# Patient Record
Sex: FEMALE | Race: WHITE
[De-identification: names, ages, dates, MRNs, and addresses within clinical notes are randomized per-mention and may not be internally consistent; named-entity substitution may affect disease eponyms.]

---

## 2017-12-09 ENCOUNTER — HOSPITAL ENCOUNTER (INPATIENT)
Dept: HOSPITAL 17 - NEPC | Age: 82
LOS: 8 days | Discharge: HOME HEALTH | DRG: 155 | End: 2017-12-17
Payer: MEDICARE

## 2017-12-09 VITALS — HEIGHT: 61 IN | WEIGHT: 162.04 LBS | BODY MASS INDEX: 30.59 KG/M2

## 2017-12-09 VITALS
HEART RATE: 83 BPM | RESPIRATION RATE: 16 BRPM | DIASTOLIC BLOOD PRESSURE: 78 MMHG | SYSTOLIC BLOOD PRESSURE: 140 MMHG | OXYGEN SATURATION: 99 % | TEMPERATURE: 98 F

## 2017-12-09 DIAGNOSIS — F32.9: ICD-10-CM

## 2017-12-09 DIAGNOSIS — E87.1: ICD-10-CM

## 2017-12-09 DIAGNOSIS — N39.0: ICD-10-CM

## 2017-12-09 DIAGNOSIS — E78.5: ICD-10-CM

## 2017-12-09 DIAGNOSIS — G25.81: ICD-10-CM

## 2017-12-09 DIAGNOSIS — M50.90: ICD-10-CM

## 2017-12-09 DIAGNOSIS — K11.21: Primary | ICD-10-CM

## 2017-12-09 DIAGNOSIS — M79.7: ICD-10-CM

## 2017-12-09 DIAGNOSIS — R11.2: ICD-10-CM

## 2017-12-09 DIAGNOSIS — Z16.12: ICD-10-CM

## 2017-12-09 DIAGNOSIS — B96.20: ICD-10-CM

## 2017-12-09 DIAGNOSIS — Z87.891: ICD-10-CM

## 2017-12-09 DIAGNOSIS — E03.9: ICD-10-CM

## 2017-12-09 DIAGNOSIS — R41.0: ICD-10-CM

## 2017-12-09 DIAGNOSIS — D32.0: ICD-10-CM

## 2017-12-09 DIAGNOSIS — L03.211: ICD-10-CM

## 2017-12-09 PROCEDURE — 80048 BASIC METABOLIC PNL TOTAL CA: CPT

## 2017-12-09 PROCEDURE — 70491 CT SOFT TISSUE NECK W/DYE: CPT

## 2017-12-09 PROCEDURE — 87040 BLOOD CULTURE FOR BACTERIA: CPT

## 2017-12-09 PROCEDURE — 85025 COMPLETE CBC W/AUTO DIFF WBC: CPT

## 2017-12-09 PROCEDURE — 87086 URINE CULTURE/COLONY COUNT: CPT

## 2017-12-09 PROCEDURE — 71010: CPT

## 2017-12-09 PROCEDURE — 87186 SC STD MICRODIL/AGAR DIL: CPT

## 2017-12-09 PROCEDURE — 96374 THER/PROPH/DIAG INJ IV PUSH: CPT

## 2017-12-09 PROCEDURE — 81001 URINALYSIS AUTO W/SCOPE: CPT

## 2017-12-09 PROCEDURE — 87077 CULTURE AEROBIC IDENTIFY: CPT

## 2017-12-09 PROCEDURE — 96375 TX/PRO/DX INJ NEW DRUG ADDON: CPT

## 2017-12-09 PROCEDURE — 93005 ELECTROCARDIOGRAM TRACING: CPT

## 2017-12-10 VITALS
TEMPERATURE: 97.5 F | OXYGEN SATURATION: 96 % | HEART RATE: 77 BPM | RESPIRATION RATE: 18 BRPM | DIASTOLIC BLOOD PRESSURE: 62 MMHG | SYSTOLIC BLOOD PRESSURE: 138 MMHG

## 2017-12-10 VITALS
SYSTOLIC BLOOD PRESSURE: 113 MMHG | DIASTOLIC BLOOD PRESSURE: 54 MMHG | HEART RATE: 75 BPM | OXYGEN SATURATION: 96 % | TEMPERATURE: 97.8 F | RESPIRATION RATE: 16 BRPM

## 2017-12-10 VITALS
SYSTOLIC BLOOD PRESSURE: 124 MMHG | HEART RATE: 82 BPM | OXYGEN SATURATION: 97 % | RESPIRATION RATE: 17 BRPM | DIASTOLIC BLOOD PRESSURE: 60 MMHG | TEMPERATURE: 98.4 F

## 2017-12-10 VITALS
RESPIRATION RATE: 18 BRPM | SYSTOLIC BLOOD PRESSURE: 102 MMHG | OXYGEN SATURATION: 97 % | HEART RATE: 89 BPM | TEMPERATURE: 98 F | DIASTOLIC BLOOD PRESSURE: 68 MMHG

## 2017-12-10 VITALS
DIASTOLIC BLOOD PRESSURE: 58 MMHG | OXYGEN SATURATION: 98 % | RESPIRATION RATE: 18 BRPM | TEMPERATURE: 97.4 F | HEART RATE: 91 BPM | SYSTOLIC BLOOD PRESSURE: 134 MMHG

## 2017-12-10 VITALS
OXYGEN SATURATION: 98 % | SYSTOLIC BLOOD PRESSURE: 115 MMHG | HEART RATE: 73 BPM | DIASTOLIC BLOOD PRESSURE: 53 MMHG | TEMPERATURE: 97.8 F | RESPIRATION RATE: 19 BRPM

## 2017-12-10 VITALS
HEART RATE: 77 BPM | DIASTOLIC BLOOD PRESSURE: 54 MMHG | OXYGEN SATURATION: 95 % | TEMPERATURE: 97.6 F | RESPIRATION RATE: 16 BRPM | SYSTOLIC BLOOD PRESSURE: 118 MMHG

## 2017-12-10 LAB
ANION GAP SERPL CALC-SCNC: 7 MEQ/L (ref 5–15)
BASOPHILS # BLD AUTO: 0 TH/MM3 (ref 0–0.2)
BASOPHILS NFR BLD: 0.4 % (ref 0–2)
BUN SERPL-MCNC: 13 MG/DL (ref 7–18)
CHLORIDE SERPL-SCNC: 95 MEQ/L (ref 98–107)
COLOR UR: YELLOW
COMMENT (UR): (no result)
CULTURE IF INDICATED: (no result)
EOSINOPHIL # BLD: 0.2 TH/MM3 (ref 0–0.4)
EOSINOPHIL NFR BLD: 2.9 % (ref 0–4)
ERYTHROCYTE [DISTWIDTH] IN BLOOD BY AUTOMATED COUNT: 15 % (ref 11.6–17.2)
GFR SERPLBLD BASED ON 1.73 SQ M-ARVRAT: 61 ML/MIN (ref 89–?)
GLUCOSE UR STRIP-MCNC: (no result) MG/DL
HCO3 BLD-SCNC: 28.4 MEQ/L (ref 21–32)
HCT VFR BLD CALC: 40 % (ref 35–46)
HEMO FLAGS: (no result)
HGB UR QL STRIP: (no result)
HYALINE CASTS #/AREA URNS LPF: 3 /LPF
KETONES UR STRIP-MCNC: (no result) MG/DL
LYMPHOCYTES # BLD AUTO: 1.3 TH/MM3 (ref 1–4.8)
LYMPHOCYTES NFR BLD AUTO: 15.3 % (ref 9–44)
MCH RBC QN AUTO: 26.8 PG (ref 27–34)
MCHC RBC AUTO-ENTMCNC: 32.8 % (ref 32–36)
MCV RBC AUTO: 81.8 FL (ref 80–100)
MONOCYTES NFR BLD: 6.8 % (ref 0–8)
MUCOUS THREADS #/AREA URNS LPF: (no result) /LPF
NEUTROPHILS # BLD AUTO: 6.4 TH/MM3 (ref 1.8–7.7)
NEUTROPHILS NFR BLD AUTO: 74.6 % (ref 16–70)
NITRITE UR QL STRIP: (no result)
PLATELET # BLD: 170 TH/MM3 (ref 150–450)
POTASSIUM SERPL-SCNC: 3.9 MEQ/L (ref 3.5–5.1)
RBC # BLD AUTO: 4.89 MIL/MM3 (ref 4–5.3)
SODIUM SERPL-SCNC: 130 MEQ/L (ref 136–145)
SP GR UR STRIP: 1.05 (ref 1–1.03)
SQUAMOUS #/AREA URNS HPF: 1 /HPF (ref 0–5)
WBC # BLD AUTO: 8.5 TH/MM3 (ref 4–11)

## 2017-12-10 RX ADMIN — NAFCILLIN SCH MLS/HR: 1 INJECTION, POWDER, FOR SOLUTION INTRAMUSCULAR; INTRAVENOUS at 21:38

## 2017-12-10 RX ADMIN — PHENYTOIN SODIUM SCH MLS/HR: 50 INJECTION INTRAMUSCULAR; INTRAVENOUS at 12:12

## 2017-12-10 RX ADMIN — HYDROCODONE BITARTRATE AND ACETAMINOPHEN PRN TAB: 5; 325 TABLET ORAL at 15:55

## 2017-12-10 RX ADMIN — Medication SCH ML: at 21:00

## 2017-12-10 RX ADMIN — CLINDAMYCIN PHOSPHATE SCH MLS/HR: 150 INJECTION, SOLUTION INTRAMUSCULAR; INTRAVENOUS at 14:19

## 2017-12-10 RX ADMIN — NAFCILLIN SCH MLS/HR: 1 INJECTION, POWDER, FOR SOLUTION INTRAMUSCULAR; INTRAVENOUS at 16:30

## 2017-12-10 RX ADMIN — HYDROCODONE BITARTRATE AND ACETAMINOPHEN PRN TAB: 5; 325 TABLET ORAL at 21:39

## 2017-12-10 RX ADMIN — CLINDAMYCIN PHOSPHATE SCH MLS/HR: 150 INJECTION, SOLUTION INTRAMUSCULAR; INTRAVENOUS at 21:46

## 2017-12-10 RX ADMIN — TAZOBACTAM SODIUM AND PIPERACILLIN SODIUM SCH MLS/HR: 375; 3 INJECTION, SOLUTION INTRAVENOUS at 04:00

## 2017-12-10 RX ADMIN — OXYBUTYNIN CHLORIDE SCH MG: 5 TABLET ORAL at 21:00

## 2017-12-10 RX ADMIN — PRAVASTATIN SODIUM SCH MG: 40 TABLET ORAL at 21:40

## 2017-12-10 RX ADMIN — TAZOBACTAM SODIUM AND PIPERACILLIN SODIUM SCH MLS/HR: 375; 3 INJECTION, SOLUTION INTRAVENOUS at 09:30

## 2017-12-10 RX ADMIN — HYDROCODONE BITARTRATE AND ACETAMINOPHEN PRN TAB: 5; 325 TABLET ORAL at 09:37

## 2017-12-10 RX ADMIN — NAFCILLIN SCH MLS/HR: 1 INJECTION, POWDER, FOR SOLUTION INTRAMUSCULAR; INTRAVENOUS at 23:57

## 2017-12-10 RX ADMIN — PHENYTOIN SODIUM SCH MLS/HR: 50 INJECTION INTRAMUSCULAR; INTRAVENOUS at 14:15

## 2017-12-10 RX ADMIN — Medication SCH ML: at 09:29

## 2017-12-10 NOTE — PD
HPI


Chief Complaint:  Skin Problem


Time Seen by Provider:  23:35


Travel History


International Travel<30 days:  No


Contact w/Intl Traveler<30days:  No


Traveled to known affect area:  No





History of Present Illness


HPI


89 year-old female presents to the emergency department in the care of her 

family for one day of progressively worsening redness and swelling to the right 

side of the face and neck.  No fever or chills.  Patient has noted some 

soreness with swallowing but no difficulty swallowing or handling her oral 

secretions.  Patient denies any injury.  Family members have noticed increasing 

swelling significant this evening.  Patient lives by herself.  Patient is 

currently on no antibiotics.  Patient has extensive past medical history 

including hypothyroidism dyslipidemia fibromyalgia depression anxiety 

osteoarthritis status post appendectomy cholecystectomy shoulder fracture 

hysterectomy cervical disc disease restless leg syndrome.  Family has been 

checking on her frequently.  This evening symptoms also associated with nausea 

and vomiting.  No chest pain no shortness of breath no stridor no hoarseness.  

No abdominal pain no flank pain dysuria frequency urgency.  Has noted increased 

generalized weakness.  Pain is 10 over 10 intensity.





PFSH


Past Medical History


*** Narrative Medical


Dyslipidemia hypothyroidism airway bowel syndrome depression and anxiety 

fibromyalgia osteoarthritis restless leg syndrome appendectomy cholecystectomy 

to [place] repair shoulder fracture repair hysterectomy cervical spine nerve block; 

no tobacco use no alcohol use; nursing notes reviewed


Hx Anticoagulant Therapy:  No


Anxiety:  Yes


Depression:  Yes


Cancer:  No


Cardiovascular Problems:  Yes


High Cholesterol:  Yes


Diabetes:  No


Endocrine:  Yes


Gastrointestinal Disorders:  Yes (Appy/ choly)


Glaucoma:  No


Gout:  Yes


Genitourinary:  No


Hepatitis:  No


Hiatal Hernia:  No


Hypertension:  Yes


Implanted Vascular Access Dvce:  No


Medical other:  No


Musculoskeletal:  Yes


Neurologic:  No


Reproductive:  No


Respiratory:  No


Thyroid Disease:  Yes (hypothytroidism)


Tubal Ligation:  Yes





Past Surgical History


Abdominal Surgery:  Yes (DEEJAY)


Appendectomy:  Yes


 Section:  Yes


Cholecystectomy:  Yes


Eye Surgery:  Yes (CATARACT RIGHT EYE)


Genitourinary Surgery:  Yes (bladder suspension)


Hysterectomy:  Yes


Pacemaker:  No


Other Surgery:  Yes





Social History


Alcohol Use:  No


Tobacco Use:  No


Substance Use:  No





Allergies-Medications


(Allergen,Severity, Reaction):  


Coded Allergies:  


     asparagus (Unverified  Allergy, Severe, sob, 17)


     bean pod (Unverified  Allergy, Severe, sob, 17)


     duloxetine (Unverified  Allergy, Unknown, 17)


     rivaroxaban (Unverified  Allergy, Unknown, 17)


     trazodone (Unverified  Allergy, Unknown, 17)


     *MDRO Multi-Drug Resistant Organism (Verified  Adverse Reaction, Unknown, 

17)


 ESBL+E.Coli urine 2016


Reported Meds & Prescriptions





Reported Meds & Active Scripts


Active


Active Prescriptions or Reported Medications Unobtainable    








Review of Systems


Except as stated in HPI:  all other systems reviewed are Neg


General / Constitutional:  No: Fever, Chills


HENT:  No: Congestion


Cardiovascular:  No: Chest Pain or Discomfort


Respiratory:  No: Shortness of Breath


Gastrointestinal:  Positive: Nausea, Vomiting, No: Abdominal Pain


Genitourinary:  No: Dysuria


Musculoskeletal:  No: Weakness


Skin:  Positive Rash, Positive Lumps


Neurologic:  Positive: Weakness, No: Dizziness, Syncope, Focal Abnormalities


Psychiatric:  No: Anxiety


Hematologic/Lymphatic:  No: Easy Bruising





Physical Exam


Narrative


GENERAL: Elderly frail female in no acute distress no respiratory distress


SKIN: Warm and dry.


HEAD: Normocephalic.


EYES: No scleral icterus. No injection or drainage. 


NECK: Supple, trachea midline. No JVD or lymphadenopathy.  Left-sided neck 

redness swelling and induration without fluctuance tenderness is noted 

increased warmth


CARDIOVASCULAR: Regular rate and rhythm without murmurs, gallops, or rubs. 


RESPIRATORY: Breath sounds equal bilaterally. No accessory muscle use.


GASTROINTESTINAL: Abdomen soft, non-tender, nondistended. 


MUSCULOSKELETAL: No cyanosis, or edema. 


BACK: Nontender without obvious deformity. No CVA tenderness.








Data


Data


Last Documented VS





Vital Signs








  Date Time  Temp Pulse Resp B/P (MAP) Pulse Ox O2 Delivery O2 Flow Rate FiO2


 


17 22:34 98.0 83 16 140/78 (98) 99 Room Air  








Orders





 Orders


Basic Metabolic Panel (Bmp) (17 23:35)


Complete Blood Count With Diff (17 23:35)


Blood Culture (17 23:35)


Ct Soft Tiss Neck W Iv Cont (17 23:35)


Iv Access Insert/Monitor (17 23:35)


Sodium Chloride 0.9% Flush (Ns Flush) (17 23:45)


Ketorolac Inj (Toradol Inj) (17 23:45)


Urinalysis - C+S If Indicated (17 23:35)


Iohexol 350 Inj (Omnipaque 350 Inj) (12/10/17 01:13)


Clindamycin 600 Mg/Ns Premix (Cleocin 60 (12/10/17 01:45)


Urine Culture (12/10/17 01:30)


Admit To Inpatient (12/10/17 )


Code Status (12/10/17 02:37)


Vital Signs (Adult) Q4H (12/10/17 02:37)


Activity Oob With Assistance (12/10/17 02:37)


Diet Regular Basic (12/10/17 Breakfast)


Sodium Chloride 0.9% Flush (Ns Flush) (12/10/17 02:45)


Sodium Chloride 0.9% Flush (Ns Flush) (12/10/17 09:00)


Acetaminophen (Tylenol) (12/10/17 02:45)


Ondansetron Inj (Zofran Inj) (12/10/17 02:45)


Basic Metabolic Panel (Bmp) (17 06:00)


Complete Blood Count With Diff (17 06:00)


Chest, Single Ap (12/10/17 02:37)


Electrocardiogram (12/10/17 02:37)


Pt Request For Service (12/10/17 02:37)


Scd Bilateral/Knee High CARL.BID (12/10/17 02:37)


Naloxone Inj (Narcan Inj) (12/10/17 02:45)


Magnesium Hydroxide Liq (Milk Of Magnesi (12/10/17 02:45)


Inpatient Certification (12/10/17 )


Admit Order (Ed Use Only) (12/10/17 )


Vital Signs (Adult) Q4H (12/10/17 02:45)


Activity Oob With Assistance (12/10/17 02:45)


Notify Dr: Other (12/10/17 02:45)





Labs





Laboratory Tests








Test


  17


23:41 12/10/17


01:30


 


White Blood Count 8.5 TH/MM3  


 


Red Blood Count 4.89 MIL/MM3  


 


Hemoglobin 13.1 GM/DL  


 


Hematocrit 40.0 %  


 


Mean Corpuscular Volume 81.8 FL  


 


Mean Corpuscular Hemoglobin 26.8 PG  


 


Mean Corpuscular Hemoglobin


Concent 32.8 % 


  


 


 


Red Cell Distribution Width 15.0 %  


 


Platelet Count 170 TH/MM3  


 


Mean Platelet Volume 9.4 FL  


 


Neutrophils (%) (Auto) 74.6 %  


 


Lymphocytes (%) (Auto) 15.3 %  


 


Monocytes (%) (Auto) 6.8 %  


 


Eosinophils (%) (Auto) 2.9 %  


 


Basophils (%) (Auto) 0.4 %  


 


Neutrophils # (Auto) 6.4 TH/MM3  


 


Lymphocytes # (Auto) 1.3 TH/MM3  


 


Monocytes # (Auto) 0.6 TH/MM3  


 


Eosinophils # (Auto) 0.2 TH/MM3  


 


Basophils # (Auto) 0.0 TH/MM3  


 


CBC Comment DIFF FINAL  


 


Differential Comment   


 


Blood Urea Nitrogen 13 MG/DL  


 


Creatinine 0.87 MG/DL  


 


Random Glucose 99 MG/DL  


 


Calcium Level 9.2 MG/DL  


 


Sodium Level 130 MEQ/L  


 


Potassium Level 3.9 MEQ/L  


 


Chloride Level 95 MEQ/L  


 


Carbon Dioxide Level 28.4 MEQ/L  


 


Anion Gap 7 MEQ/L  


 


Estimat Glomerular Filtration


Rate 61 ML/MIN 


  


 


 


Urine Color  YELLOW 


 


Urine Turbidity  HAZY 


 


Urine pH  6.0 


 


Urine Specific Gravity  1.050 


 


Urine Protein  TRACE mg/dL 


 


Urine Glucose (UA)  NEG mg/dL 


 


Urine Ketones  NEG mg/dL 


 


Urine Occult Blood  NEG 


 


Urine Nitrite  POS 


 


Urine Bilirubin  NEG 


 


Urine Urobilinogen


  


  LESS THAN 2.0


MG/DL


 


Urine Leukocyte Esterase  LARGE 


 


Urine RBC  3 /hpf 


 


Urine WBC  28 /hpf 


 


Urine WBC Clumps  MOD 


 


Urine Squamous Epithelial


Cells 


  1 /hpf 


 


 


Urine Hyaline Casts  3 /lpf 


 


Urine Mucus  FEW /lpf 


 


Microscopic Urinalysis Comment


  


  CATH-CULTURE


IND











MDM


Medical Decision Making


Medical Screen Exam Complete:  Yes


Emergency Medical Condition:  Yes


Medical Record Reviewed:  Yes


Interpretation(s)


CBC & BMP Diagram


17 23:41








Calcium Level 9.2








Vital Signs








  Date Time  Temp Pulse Resp B/P (MAP) Pulse Ox O2 Delivery O2 Flow Rate FiO2


 


17 22:34 98.0 83 16 140/78 (98) 99 Room Air  





CT soft tissue neck FINDINGS:     


There is an extra-axial appearing mass in the left frontal region 

intracranially measured 3.1 x 2.2 cm in AP and transverse dimension. This has 

been described previously. The esophagus is distended and fluid-filled. 

Nasopharynx, oropharynx, hypopharynx and larynx are unremarkable. Thyroid is 

unremarkable. The submandibular glands and right parotid gland are normal. The 

left parotid gland demonstrates increased density relative to the right without 

focal mass. There is stranding of the subcutaneous fat of the left face and 

skin thickening characteristic of cellulitis. A parotitis is also suspected 

given the asymmetric enhancement of the left parotid gland. There is 

atherosclerotic plaquing of the bilateral carotid bifurcations. No 

pathologically enlarged lymph nodes are identified.


 


CONCLUSION:     


1. Abnormal stranding of the subcutaneous fat and skin thickening left neck and 

parotid region with asymmetric enhancement of the left parotid as compared to 

the right. The findings would be characteristic of a parotitis and cellulitis.


2. Mildly distended, fluid-filled esophagus is present.


3. Extra-axial left frontal intracranial mass previously characterized as a 

meningioma.


4. Atherosclerosis.


EKG normal sinus rhythm rate 81 no acute ST elevation injury pattern or ectopy 

noted left anterior fascicular block is present incomplete right bundle branch 

block noted


Differential Diagnosis


Cellulitis parotitis sialadenitis mass sepsis also to consider unlikely 

vascular abnormality aneurysm


Narrative Course


IV access obtained specimens collected and sent for resulting imaging studies 

ordered patient administered IV antibiotic





Physician Communication


Physician Communication


discussed with Dr Hudson for admission





Diagnosis





 Primary Impression:  


 Parotitis, acute


 Additional Impression:  


 Cellulitis of face





Admitting Information


Admitting Physician Requests:  Observation


Scripts


Unable to Obtain Active Prescriptions or Reported Meds











Mckenzie Kidd MD Dec 10, 2017 02:31

## 2017-12-10 NOTE — EKG
Date Performed: 12/10/2017       Time Performed: 02:51:54

 

PTAGE:      89 years

 

EKG:      Sinus rhythm 

 

 PATTERN CONSISTENT WITH PULMONARY DISEASE INCOMPLETE RIGHT BUNDLE BRANCH BLOCK LEFT ANTERIOR FASCICU
LAR BLOCK ABNORMAL ECG

 

PREVIOUS TRACING       : 04/25/2016 15.36 Since previous tracing, axis is more leftward and the incom
plete right bundle branch block is new.

 

DOCTOR:   Jermaine Ribeiro  Interpretating Date/Time  12/10/2017 12:40:03

## 2017-12-10 NOTE — RADRPT
EXAM DATE/TIME:  12/10/2017 03:01 

 

[facility] COMPARISON:     

CHEST SINGLE AP, April 30, 2016, 13:17.

 

                     

INDICATIONS :     

Cough.

                     

 

MEDICAL HISTORY :            

Hypertension. Cardiovascular disease. Asthma.   

 

SURGICAL HISTORY :        

Appendectomy. Cholecystectomy.Hysterectomy.

 

ENCOUNTER:     

Initial                                        

 

ACUITY:     

1 day      

 

PAIN SCORE:     

0/10

 

LOCATION:     

Bilateral chest 

 

FINDINGS:     

Cardiomegaly. Aortic calcification. No consolidation or effusion. Lung findings are diminished.

 

CONCLUSION:     No acute disease.  

 

 

 

 Tobi Alan MD on December 10, 2017 at 3:12           

Board Certified Radiologist.

 This report was verified electronically.

## 2017-12-10 NOTE — HHI.HP
HPI


Service


[facility]


Primary Care Physician


Laurie Mercedes MD


Admission Diagnosis





Facial cellulitis L parotitis


Chief Complaint:  


pain and swelling of neck


Travel History


International Travel<30 Days:  No


Contact w/Intl Traveler <30 Da:  No


Traveled to Known Affected Are:  No


History of Present Illness


Ms. Redd is a pleasant 88 y/o female with hypothyroidism, hyperlipidemia, RLS, 

Fibromyalgia DDD and depression/anxiety.  Patient presents to the hospital due 

to progressively worsening redness and swelling to the left side of the face 

and neck, patient unable to tell exactly how long this has been present.  

Patient has noted some soreness with swallowing but no difficulty swallowing or 

handling her oral secretions.  Patient denies any injury.  Family members have 

noticed increasing swelling significant on the evening of .  Patient lives 

by herself.  Family has been checking on her frequently.  This evening symptoms 

also associated with increased generalized weakness, nausea and vomiting.  

Patient denies fevers, chills, chest pain, shortness of breath, stridor, 

hoarseness, abdominal pain, flank pain, dysuria, increased urinary frequency 

urgency.





Review of Systems


Constitutional:  DENIES: Fatigue, Fever, Chills


Eyes:  DENIES: Blurred vision, Diplopia, Vision loss


Ears, nose, mouth, throat:  COMPLAINS OF: Throat pain


Respiratory:  DENIES: Cough, Sputum production, Shortness of breath


Cardiovascular:  DENIES: Chest pain, Palpitations, Dyspnea on Exertion, Lower 

Extremity Edema


Gastrointestinal:  COMPLAINS OF: Nausea, Vomiting, DENIES: Abdominal pain


Neurologic:  DENIES: Abnormal gait, Headache, Localized weakness, Speech 

Problems


Psychiatric:  COMPLAINS OF: Confusion (trying to reach family to see if this is 

patient's baseline), DENIES: Anxiety, Depression





Past Family Social History


Past Medical History


Hyperlipidemia


Hypothyroidism


IBS


Depression


Anxiety


Fibromyalgia


DDD


Osteoarthritis


RLS


Past Surgical History


Appendectomy


Cholecystectomy


Rectocele repair


Left shoulder fracture repair


AVI with BSO


Cervical spine nerve block


Reported Medications


Pramipexole ER 24 HR (Pramipexole Dihydrochloride) 1.5 Mg Tab 1.5 Mg PO DAILY


Potassium Chloride ER (Potassium Chloride) 8 Meq Cap 8 Meq PO DAILY PRN


Ditropan (Oxybutynin Chloride) 5 Mg Tab 5 Mg PO Q12HR


Omeprazole 20 Mg Tab 20 Mg PO DAILY


Multiple Vitamin (Multivitamin with Minerals) 1 Each Tablet   


Lovastatin 40 Mg Tab 40 Mg PO HS


Levothyroxine (Levothyroxine Sodium) 100 Mcg Tab 100 Mcg PO DAILY


Lasix (Furosemide) 20 Mg Tab 20 Mg PO DAILY PRN


Bupropion HCl ER 24 HR (Bupropion HCl) 300 Mg Tab 300 Mg PO DAILY


Allergies:  


Coded Allergies:  


     asparagus (Unverified  Allergy, Severe, sob, 17)


     bean pod (Unverified  Allergy, Severe, sob, 17)


     duloxetine (Unverified  Allergy, Unknown, 17)


     rivaroxaban (Unverified  Allergy, Unknown, 17)


     trazodone (Unverified  Allergy, Unknown, 17)


Active Ordered Medications





Current Medications








 Medications


  (Trade)  Dose


 Ordered  Sig/Jaden


 Route  Start Time


 Stop Time Status Last Admin


 


  (NS Flush)  2 ml  UNSCH  PRN


 IV FLUSH  12/10/17 02:45


     


 


 


  (NS Flush)  2 ml  BID


 IV FLUSH  12/10/17 09:00


     


 


 


  (Tylenol)  650 mg  Q4H  PRN


 PO  12/10/17 02:45


     


 


 


  (Zofran Inj)  4 mg  Q6H  PRN


 IVP  12/10/17 02:45


     


 


 


  (Narcan Inj)  0.4 mg  UNSCH  PRN


 IV PUSH  12/10/17 02:45


     


 


 


  (Milk Of


 Magnesia Liq)  30 ml  Q12H  PRN


 PO  12/10/17 02:45


     


 


 


 Piperacillin Sod/


 Tazobactam Sod  50 ml @ 


 100 mls/hr  Q6H


 IV  12/10/17 04:00


    12/10/17 04:00


 


 


  (Norco  5-325 Mg)  1 tab  Q6H  PRN


 PO  12/10/17 02:45


     


 


 


  (Dilaudid Pf Inj)  0.2 mg  Q4H  PRN


 IV PUSH  12/10/17 02:45


     


 








Family History


Mother with hx of COPD


Social History


Hx of tobacco use





Physical Exam


Vital Signs





Vital Signs








  Date Time  Temp Pulse Resp B/P (MAP) Pulse Ox O2 Delivery O2 Flow Rate FiO2


 


12/10/17 03:54 98.4 82 17 124/60 (81) 97   


 


17 22:34 98.0 83 16 140/78 (98) 99 Room Air  








Physical Exam


GENERAL: This is a well-nourished, well-developed patient, noted erythema and 

edema left side of neck no airway compromise at this time


SKIN: erythema and edema left side of neck. left side of neck exquisitely 

tender to light palpation


HEAD: Atraumatic. Normocephalic. No temporal or scalp tenderness.


EYES:Extraocular motions intact. No scleral icterus. No injection or drainage. 


ENT: Nose without bleeding, purulent drainage or septal hematoma. Throat 

without erythema, tonsillar hypertrophy or exudate. Uvula midline. Airway 

patent.


NECK: Trachea midline. No JVD or lymphadenopathy. Supple, nontender, no 

meningeal signs.


CARDIOVASCULAR: Regular rate and rhythm 


RESPIRATORY: Clear to auscultation. Breath sounds equal bilaterally. 


GASTROINTESTINAL: Abdomen soft, non-tender, nondistended. 


MUSCULOSKELETAL: Extremities without clubbing, cyanosis, or edema. No joint 

tenderness, effusion, or edema noted. No calf tenderness. Negative Homans sign 

bilaterally.


NEUROLOGICAL: Awake and alert with confusion.  No focal deficits noted.  Motor 

and sensory grossly within normal limits. 4 out of 5 muscle strength in all 

muscle groups.  Normal speech.


Laboratory





Laboratory Tests








Test


  17


23:41 12/10/17


01:30


 


White Blood Count 8.5  


 


Red Blood Count 4.89  


 


Hemoglobin 13.1  


 


Hematocrit 40.0  


 


Mean Corpuscular Volume 81.8  


 


Mean Corpuscular Hemoglobin 26.8  


 


Mean Corpuscular Hemoglobin


Concent 32.8 


  


 


 


Red Cell Distribution Width 15.0  


 


Platelet Count 170  


 


Mean Platelet Volume 9.4  


 


Neutrophils (%) (Auto) 74.6  


 


Lymphocytes (%) (Auto) 15.3  


 


Monocytes (%) (Auto) 6.8  


 


Eosinophils (%) (Auto) 2.9  


 


Basophils (%) (Auto) 0.4  


 


Neutrophils # (Auto) 6.4  


 


Lymphocytes # (Auto) 1.3  


 


Monocytes # (Auto) 0.6  


 


Eosinophils # (Auto) 0.2  


 


Basophils # (Auto) 0.0  


 


CBC Comment DIFF FINAL  


 


Differential Comment   


 


Blood Urea Nitrogen 13  


 


Creatinine 0.87  


 


Random Glucose 99  


 


Calcium Level 9.2  


 


Sodium Level 130  


 


Potassium Level 3.9  


 


Chloride Level 95  


 


Carbon Dioxide Level 28.4  


 


Anion Gap 7  


 


Estimat Glomerular Filtration


Rate 61 


  


 


 


Urine Color  YELLOW 


 


Urine Turbidity  HAZY 


 


Urine pH  6.0 


 


Urine Specific Gravity  1.050 


 


Urine Protein  TRACE 


 


Urine Glucose (UA)  NEG 


 


Urine Ketones  NEG 


 


Urine Occult Blood  NEG 


 


Urine Nitrite  POS 


 


Urine Bilirubin  NEG 


 


Urine Urobilinogen  LESS THAN 2.0 


 


Urine Leukocyte Esterase  LARGE 


 


Urine RBC  3 


 


Urine WBC  28 


 


Urine WBC Clumps  MOD 


 


Urine Squamous Epithelial


Cells 


  1 


 


 


Urine Hyaline Casts  3 


 


Urine Mucus  FEW 


 


Microscopic Urinalysis Comment


  


  CATH-CULTURE


IND














 Date/Time


Source Procedure


Growth Status


 


 


 17 23:41


Blood Peripheral Aerobic Blood Culture


Pending Received


 


 17 23:41


Blood Peripheral Anaerobic Blood Culture


Pending Received


 


 12/10/17 01:30


Urine Catheterized Urine Urine Culture


Pending Received








Result Diagram:  


17 2341                                                                   

             17 2341





Imaging





Last Impressions








Chest X-Ray 12/10/17 0237 Signed





Impressions: 





 Service Date/Time:  Hari, December 10, 2017 03:01 - CONCLUSION: No acute 





 disease.       Tobi Alan MD 


 


Neck CT 17 2335 Signed





Impressions: 





 Service Date/Time:  Hari, December 10, 2017 00:32 - CONCLUSION:  1. Abnormal 





 stranding of the subcutaneous fat and skin thickening left neck and parotid 





 region with asymmetric enhancement of the left parotid as compared to the 

right. 





 The findings would be characteristic of a parotitis and cellulitis. 2. Mildly 





 distended, fluid-filled esophagus is present. 3. Extra-axial left frontal 





 intracranial mass previously characterized as a meningioma. 4. 

Atherosclerosis.  





    Tobi Alan MD 











Caprini VTE Risk Assessment


Caprini VTE Risk Assessment:  Mod/High Risk (score >= 2)


Caprini Risk Assessment Model











 Point Value = 1          Point Value = 2  Point Value = 3  Point Value = 5


 


Age 41-60


Minor surgery


BMI > 25 kg/m2


Swollen legs


Varicose veins


Pregnancy or postpartum


History of unexplained or recurrent


   spontaneous 


Oral contraceptives or hormone


   replacement


Sepsis (< 1 month)


Serious lung disease, including


   pneumonia (< 1 month)


Abnormal pulmonary function


Acute myocardial infarction


Congestive heart failure (< 1 month)


History of inflammatory bowel disease


Medical patient at bed rest Age 61-74


Arthroscopic surgery


Major open surgery (> 45 min)


Laparoscopic surgery (> 45 min)


Malignancy


Confined to bed (> 72 hours)


Immobilizing plaster cast


Central venous access Age >= 75


History of VTE


Family history of VTE


Factor V Leiden


Prothrombin [id number]


Lupus anticoagulant


Anticardiolipin antibodies


Elevated serum homocysteine


Heparin-induced thrombocytopenia


Other congenital or acquired


   thrombophilia Stroke (< 1 month)


Elective arthroplasty


Hip, pelvis, or leg fracture


Acute spinal cord injury (< 1 month)








Prophylaxis Regimen











   Total Risk


Factor Score Risk Level Prophylaxis Regimen


 


0-1      Low Early ambulation


 


2 Moderate Order ONE of the following:


*Sequential Compression Device (SCD)


*Heparin 5000 units SQ BID


 


3-4 Higher Order ONE of the following medications:


*Heparin 5000 units SQ TID


*Enoxaparin/Lovenox 40 mg SQ daily (WT < 150 kg, CrCl > 30 mL/min)


*Enoxaparin/Lovenox 30 mg SQ daily (WT < 150 kg, CrCl > 10-29 mL/min)


*Enoxaparin/Lovenox 30 mg SQ BID (WT < 150 kg, CrCl > 30 mL/min)


AND/OR


*Sequential Compression Device (SCD)


 


5 or more Highest Order ONE of the following medications:


*Heparin 5000 units SQ TID (Preferred with Epidurals)


*Enoxaparin/Lovenox 40 mg SQ daily (WT < 150 kg, CrCl > 30 mL/min)


*Enoxaparin/Lovenox 30 mg SQ daily (WT < 150 kg, CrCl > 10-29 mL/min)


*Enoxaparin/Lovenox 30 mg SQ BID (WT < 150 kg, CrCl > 30 mL/min)


AND


*Sequential Compression Device (SCD)











Assessment and Plan


Problem List:  


(1) Parotitis, acute


ICD Codes:  K11.21 - Acute sialoadenitis


Status:  Acute


Plan:  Patient had noticed progressively worsening redness and swelling to the 

left side of the face and neck.  Patient has noted some soreness with 

swallowing but no difficulty swallowing or handling her oral secretions.  

Patient denies any injury.  Family members have noticed increasing swelling 

significant this evening with associated generalized weakness, nausea and 

vomiting.


- Neck CT reviewed and reveals:   


1. Abnormal stranding of the subcutaneous fat and skin thickening left neck and 

parotid region with asymmetric enhancement of the left parotid as compared to 

the right. The findings would be characteristic of a parotitis and cellulitis.


2. Mildly distended, fluid-filled esophagus is present.


3. Extra-axial left frontal intracranial mass previously characterized as a 

meningioma.


4. Atherosclerosis.


- Patient given Zosyn and Clindamycin IV in ER


- acetaminophen and norco as needed for pain


- Monitor patient closely


- blood cultures


- urine culture


- will start IV hydration


- Will change to Nafcillin and monitor closely


- if area consolidates may request I&D per IR


- may also consult ENT if this does not improve with fluids and IV abx








DVT prophlaysis with SCDs





(2) Cellulitis of face


ICD Codes:  L03.211 - Cellulitis of face


Status:  Acute


Plan:  see above





(3) Nausea & vomiting


ICD Codes:  R11.2 - Nausea with vomiting, unspecified


Status:  Resolved


Plan:  - Zofran needed


- supportive care





(4) UTI (urinary tract infection)


ICD Codes:  N39.0 - Urinary tract infection, site not specified


Plan:  UA reviewed consistent with UTI


patient on Zosyn which will also cover UTI


await cultures results





(5) Hyponatremia


ICD Codes:  E87.1 - Hypo-osmolality and hyponatremia


Status:  Acute


Plan:  Na 130 on admission


likely secondary to poor PO intake with encourage PO intake 


start IV fluids 


recheck in AM





(6) Hypothyroidism


ICD Codes:  E03.9 - Hypothyroidism, unspecified


Status:  Chronic


Plan:  - Cont. home meds





(7) Hyperlipidemia


ICD Codes:  E78.5 - Hyperlipidemia, unspecified


Status:  Chronic


Plan:  - Cont. home meds





(8) Depression


ICD Codes:  F32.9 - Major depressive disorder, single episode, unspecified


Status:  Chronic


Plan:  - Cont. home meds





Assessment and Plan


Patient examined.


Assessment and plan formulated with Hafsa Bolanos PA-C.


I agree with the above.











Hafsa Bolanos Dec 10, 2017 08:02


Moisés Hudson DO Dec 15, 2017 13:06

## 2017-12-10 NOTE — RADRPT
EXAM DATE/TIME:  12/10/2017 00:32 

 

[facility] COMPARISON:     

MRI BRAIN W/O CONTRAST, March 08, 2011, 8:59.  MRI BRAIN W/O CONTRAST, March 06, 2011, 16:58.  CT BRA
IN W/O CONTRAST, April 25, 2016, 14:25.

 

 

INDICATIONS :     

Left sided neck swelling.

                      

 

IV CONTRAST:     

70 cc Omnipaque 350 (iohexol) IV 

                      

 

RADIATION DOSE:     

14.38 CTDIvol (mGy) 

 

 

MEDICAL HISTORY :     

Hypertension. Cardiovascular disease 

 

SURGICAL HISTORY :      

Appendectomy. Cholecystectomy.Hysterectomy.

 

ENCOUNTER:      

Initial

 

ACUITY:      

1 day

 

PAIN SCALE:      

3/10

 

LOCATION:        

neck 

 

TECHNIQUE:     

Volumetric scanning of the neck was performed.  Using automated exposure control and adjustment of th
e mA and/or kV according to patient size, radiation dose was kept as low as reasonably achievable to 
obtain optimal diagnostic quality images.   DICOM format image data is available electronically for r
eview and comparison.  

 

FINDINGS:     

There is an extra-axial appearing mass in the left frontal region intracranially measured 3.1 x 2.2 c
m in AP and transverse dimension. This has been described previously. The esophagus is distended and 
fluid-filled. Nasopharynx, oropharynx, hypopharynx and larynx are unremarkable. Thyroid is unremarkab
le. The submandibular glands and right parotid gland are normal. The left parotid gland demonstrates 
increased density relative to the right without focal mass. There is stranding of the subcutaneous fa
t of the left face and skin thickening characteristic of cellulitis. A parotitis is also suspected gi
dustin the asymmetric enhancement of the left parotid gland. There is atherosclerotic plaquing of the bi
lateral carotid bifurcations. No pathologically enlarged lymph nodes are identified.

 

CONCLUSION:     

1. Abnormal stranding of the subcutaneous fat and skin thickening left neck and parotid region with a
symmetric enhancement of the left parotid as compared to the right. The findings would be characteris
tic of a parotitis and cellulitis.

2. Mildly distended, fluid-filled esophagus is present.

3. Extra-axial left frontal intracranial mass previously characterized as a meningioma.

4. Atherosclerosis.

 

 

 

 Tobi Alan MD on December 10, 2017 at 2:09           

Board Certified Radiologist.

 This report was verified electronically.

## 2017-12-11 VITALS
SYSTOLIC BLOOD PRESSURE: 105 MMHG | RESPIRATION RATE: 18 BRPM | DIASTOLIC BLOOD PRESSURE: 51 MMHG | OXYGEN SATURATION: 97 % | TEMPERATURE: 97.6 F | HEART RATE: 81 BPM

## 2017-12-11 VITALS
OXYGEN SATURATION: 94 % | HEART RATE: 73 BPM | DIASTOLIC BLOOD PRESSURE: 45 MMHG | SYSTOLIC BLOOD PRESSURE: 93 MMHG | TEMPERATURE: 97.8 F | RESPIRATION RATE: 20 BRPM

## 2017-12-11 VITALS
OXYGEN SATURATION: 95 % | SYSTOLIC BLOOD PRESSURE: 128 MMHG | TEMPERATURE: 97.9 F | DIASTOLIC BLOOD PRESSURE: 86 MMHG | RESPIRATION RATE: 18 BRPM | HEART RATE: 87 BPM

## 2017-12-11 VITALS
TEMPERATURE: 97.6 F | SYSTOLIC BLOOD PRESSURE: 109 MMHG | OXYGEN SATURATION: 97 % | DIASTOLIC BLOOD PRESSURE: 50 MMHG | RESPIRATION RATE: 18 BRPM | HEART RATE: 69 BPM

## 2017-12-11 VITALS
OXYGEN SATURATION: 95 % | SYSTOLIC BLOOD PRESSURE: 91 MMHG | RESPIRATION RATE: 20 BRPM | HEART RATE: 85 BPM | TEMPERATURE: 98.3 F | DIASTOLIC BLOOD PRESSURE: 60 MMHG

## 2017-12-11 VITALS
RESPIRATION RATE: 20 BRPM | TEMPERATURE: 98 F | OXYGEN SATURATION: 98 % | SYSTOLIC BLOOD PRESSURE: 118 MMHG | HEART RATE: 75 BPM | DIASTOLIC BLOOD PRESSURE: 55 MMHG

## 2017-12-11 LAB
ANION GAP SERPL CALC-SCNC: 7 MEQ/L (ref 5–15)
BASOPHILS # BLD AUTO: 0 TH/MM3 (ref 0–0.2)
BASOPHILS NFR BLD: 0.7 % (ref 0–2)
BUN SERPL-MCNC: 10 MG/DL (ref 7–18)
CHLORIDE SERPL-SCNC: 102 MEQ/L (ref 98–107)
EOSINOPHIL # BLD: 0.2 TH/MM3 (ref 0–0.4)
EOSINOPHIL NFR BLD: 3.2 % (ref 0–4)
ERYTHROCYTE [DISTWIDTH] IN BLOOD BY AUTOMATED COUNT: 14.9 % (ref 11.6–17.2)
GFR SERPLBLD BASED ON 1.73 SQ M-ARVRAT: 69 ML/MIN (ref 89–?)
HCO3 BLD-SCNC: 25.1 MEQ/L (ref 21–32)
HCT VFR BLD CALC: 32.9 % (ref 35–46)
HEMO FLAGS: (no result)
LYMPHOCYTES # BLD AUTO: 1.2 TH/MM3 (ref 1–4.8)
LYMPHOCYTES NFR BLD AUTO: 18.7 % (ref 9–44)
MCH RBC QN AUTO: 26.9 PG (ref 27–34)
MCHC RBC AUTO-ENTMCNC: 33.2 % (ref 32–36)
MCV RBC AUTO: 81.2 FL (ref 80–100)
MONOCYTES NFR BLD: 10 % (ref 0–8)
NEUTROPHILS # BLD AUTO: 4.3 TH/MM3 (ref 1.8–7.7)
NEUTROPHILS NFR BLD AUTO: 67.4 % (ref 16–70)
PLATELET # BLD: 125 TH/MM3 (ref 150–450)
POTASSIUM SERPL-SCNC: 3.5 MEQ/L (ref 3.5–5.1)
RBC # BLD AUTO: 4.05 MIL/MM3 (ref 4–5.3)
SODIUM SERPL-SCNC: 134 MEQ/L (ref 136–145)
WBC # BLD AUTO: 6.4 TH/MM3 (ref 4–11)

## 2017-12-11 RX ADMIN — PHENYTOIN SODIUM SCH MLS/HR: 50 INJECTION INTRAMUSCULAR; INTRAVENOUS at 01:20

## 2017-12-11 RX ADMIN — BUPROPION HYDROCHLORIDE SCH MG: 150 TABLET, FILM COATED ORAL at 08:34

## 2017-12-11 RX ADMIN — NAFCILLIN SCH MLS/HR: 1 INJECTION, POWDER, FOR SOLUTION INTRAMUSCULAR; INTRAVENOUS at 03:41

## 2017-12-11 RX ADMIN — HYDROCODONE BITARTRATE AND ACETAMINOPHEN PRN TAB: 5; 325 TABLET ORAL at 03:41

## 2017-12-11 RX ADMIN — TAZOBACTAM SODIUM AND PIPERACILLIN SODIUM SCH MLS/HR: 375; 3 INJECTION, SOLUTION INTRAVENOUS at 12:08

## 2017-12-11 RX ADMIN — OXYBUTYNIN CHLORIDE SCH MG: 5 TABLET ORAL at 21:00

## 2017-12-11 RX ADMIN — LEVOTHYROXINE SODIUM SCH MCG: 100 TABLET ORAL at 05:45

## 2017-12-11 RX ADMIN — PHENYTOIN SODIUM SCH MLS/HR: 50 INJECTION INTRAMUSCULAR; INTRAVENOUS at 16:49

## 2017-12-11 RX ADMIN — HYDROCODONE BITARTRATE AND ACETAMINOPHEN PRN TAB: 10; 325 TABLET ORAL at 16:46

## 2017-12-11 RX ADMIN — TAZOBACTAM SODIUM AND PIPERACILLIN SODIUM SCH MLS/HR: 375; 3 INJECTION, SOLUTION INTRAVENOUS at 21:19

## 2017-12-11 RX ADMIN — OXYBUTYNIN CHLORIDE SCH MG: 5 TABLET ORAL at 08:35

## 2017-12-11 RX ADMIN — PHENYTOIN SODIUM SCH MLS/HR: 50 INJECTION INTRAMUSCULAR; INTRAVENOUS at 03:41

## 2017-12-11 RX ADMIN — BUPROPION HYDROCHLORIDE SCH MG: 150 TABLET, FILM COATED ORAL at 21:20

## 2017-12-11 RX ADMIN — Medication SCH ML: at 08:33

## 2017-12-11 RX ADMIN — NAFCILLIN SCH MLS/HR: 1 INJECTION, POWDER, FOR SOLUTION INTRAMUSCULAR; INTRAVENOUS at 08:34

## 2017-12-11 RX ADMIN — HYDROCODONE BITARTRATE AND ACETAMINOPHEN PRN TAB: 10; 325 TABLET ORAL at 23:51

## 2017-12-11 RX ADMIN — PRAVASTATIN SODIUM SCH MG: 40 TABLET ORAL at 21:19

## 2017-12-11 RX ADMIN — PANTOPRAZOLE SODIUM SCH MG: 20 TABLET, DELAYED RELEASE ORAL at 08:34

## 2017-12-11 RX ADMIN — Medication SCH ML: at 21:19

## 2017-12-11 NOTE — HHI.PR
Subjective


Remarks


Patient reports pain Left side of neck not relieved by pain medication


difficultly swallowing improving some


denies SOB, dyspnea or difficulty managing oral secretions





Objective


Vitals





Vital Signs








  Date Time  Temp Pulse Resp B/P (MAP) Pulse Ox O2 Delivery O2 Flow Rate FiO2


 


12/11/17 08:10 97.8 73 20 93/45 (61) 94   


 


12/11/17 04:53 97.6 81 18 105/51 (69) 97   


 


12/11/17 00:00 97.6 69 18 109/50 (69) 97   


 


12/10/17 19:30 97.8 73 19 115/53 (73) 98   


 


12/10/17 17:16 97.5 77 18 138/62 (87) 96   


 


12/10/17 17:00   18     


 


12/10/17 15:36 97.6 77 16 118/54 (75) 95   


 


12/10/17 12:50 97.8 75 16 113/54 (73) 96   








Result Diagram:  


12/11/17 0715                                                                  

              12/11/17 0715





Other Results





 Laboratory Tests








Test


  12/9/17


23:41 12/10/17


01:30 12/11/17


07:15


 


White Blood Count 8.5 TH/MM3   6.4 TH/MM3 


 


Red Blood Count 4.89 MIL/MM3   4.05 MIL/MM3 


 


Hemoglobin 13.1 GM/DL   10.9 GM/DL 


 


Hematocrit 40.0 %   32.9 % 


 


Mean Corpuscular Volume 81.8 FL   81.2 FL 


 


Mean Corpuscular Hemoglobin 26.8 PG   26.9 PG 


 


Mean Corpuscular Hemoglobin


Concent 32.8 % 


  


  33.2 % 


 


 


Red Cell Distribution Width 15.0 %   14.9 % 


 


Platelet Count 170 TH/MM3   125 TH/MM3 


 


Mean Platelet Volume 9.4 FL   9.2 FL 


 


Neutrophils (%) (Auto) 74.6 %   67.4 % 


 


Lymphocytes (%) (Auto) 15.3 %   18.7 % 


 


Monocytes (%) (Auto) 6.8 %   10.0 % 


 


Eosinophils (%) (Auto) 2.9 %   3.2 % 


 


Basophils (%) (Auto) 0.4 %   0.7 % 


 


Neutrophils # (Auto) 6.4 TH/MM3   4.3 TH/MM3 


 


Lymphocytes # (Auto) 1.3 TH/MM3   1.2 TH/MM3 


 


Monocytes # (Auto) 0.6 TH/MM3   0.6 TH/MM3 


 


Eosinophils # (Auto) 0.2 TH/MM3   0.2 TH/MM3 


 


Basophils # (Auto) 0.0 TH/MM3   0.0 TH/MM3 


 


CBC Comment DIFF FINAL   DIFF FINAL 


 


Differential Comment     


 


Blood Urea Nitrogen 13 MG/DL   10 MG/DL 


 


Creatinine 0.87 MG/DL   0.79 MG/DL 


 


Random Glucose 99 MG/DL   88 MG/DL 


 


Calcium Level 9.2 MG/DL   7.8 MG/DL 


 


Sodium Level 130 MEQ/L   134 MEQ/L 


 


Potassium Level 3.9 MEQ/L   3.5 MEQ/L 


 


Chloride Level 95 MEQ/L   102 MEQ/L 


 


Carbon Dioxide Level 28.4 MEQ/L   25.1 MEQ/L 


 


Anion Gap 7 MEQ/L   7 MEQ/L 


 


Estimat Glomerular Filtration


Rate 61 ML/MIN 


  


  69 ML/MIN 


 


 


Urine Color  YELLOW  


 


Urine Turbidity  HAZY  


 


Urine pH  6.0  


 


Urine Specific Gravity  1.050  


 


Urine Protein  TRACE mg/dL  


 


Urine Glucose (UA)  NEG mg/dL  


 


Urine Ketones  NEG mg/dL  


 


Urine Occult Blood  NEG  


 


Urine Nitrite  POS  


 


Urine Bilirubin  NEG  


 


Urine Urobilinogen


  


  LESS THAN 2.0


MG/DL 


 


 


Urine Leukocyte Esterase  LARGE  


 


Urine RBC  3 /hpf  


 


Urine WBC  28 /hpf  


 


Urine WBC Clumps  MOD  


 


Urine Squamous Epithelial


Cells 


  1 /hpf 


  


 


 


Urine Hyaline Casts  3 /lpf  


 


Urine Mucus  FEW /lpf  


 


Microscopic Urinalysis Comment


  


  CATH-CULTURE


IND 


 








Imaging





Last Impressions








Chest X-Ray 12/10/17 0237 Signed





Impressions: 





 Service Date/Time:  Hari, December 10, 2017 03:01 - CONCLUSION: No acute 





 disease.       Tobi Alan MD 


 


Neck CT 12/9/17 3669 Signed





Impressions: 





 Service Date/Time:  Hari, December 10, 2017 00:32 - CONCLUSION:  1. Abnormal 





 stranding of the subcutaneous fat and skin thickening left neck and parotid 





 region with asymmetric enhancement of the left parotid as compared to the 

right. 





 The findings would be characteristic of a parotitis and cellulitis. 2. Mildly 





 distended, fluid-filled esophagus is present. 3. Extra-axial left frontal 





 intracranial mass previously characterized as a meningioma. 4. 

Atherosclerosis.  





    Tobi Alan MD 








Objective Remarks


GENERAL: This is a well-nourished, well-developed patient, noted erythema and 

edema left side of neck no airway compromise at this time


SKIN:  left side of neck exquisitely tender to light palpation erythema and 

edema left side of neck. improved from yesterday


ENT: Nose without bleeding, purulent drainage or septal hematoma. Throat 

without erythema, tonsillar hypertrophy or exudate. Uvula midline. Airway 

patent.


CARDIOVASCULAR: Regular rate and rhythm 


RESPIRATORY: Clear to auscultation. Breath sounds equal bilaterally. 


GASTROINTESTINAL: Abdomen soft, non-tender, nondistended. 


MUSCULOSKELETAL: Extremities without clubbing, cyanosis, or edema. No joint 

tenderness, effusion, or edema noted. No calf tenderness. Negative Homans sign 

bilaterally.


NEUROLOGICAL: Awake and alert with confusion.  No focal deficits noted.  Motor 

and sensory grossly within normal limits. 4 out of 5 muscle strength in all 

muscle groups.  Normal speech.





A/P


Problem List:  


(1) Parotitis, acute


ICD Codes:  K11.21 - Acute sialoadenitis


Status:  Acute


Plan:  (1) Parotitis, acute


ICD Codes:  K11.21 - Acute sialoadenitis


Status:  Acute


Plan:  Patient had noticed progressively worsening redness and swelling to the 

left side of the face and neck.  Patient has noted some soreness with 

swallowing but no difficulty swallowing or handling her oral secretions.  

Patient denies any injury.  Family members have noticed increasing swelling 

significant this evening with associated generalized weakness, nausea and 

vomiting.


- Neck CT reviewed and reveals:   


1. Abnormal stranding of the subcutaneous fat and skin thickening left neck and 

parotid region with asymmetric enhancement of the left parotid as compared to 

the right. The findings would be characteristic of a parotitis and cellulitis.


2. Mildly distended, fluid-filled esophagus is present.


3. Extra-axial left frontal intracranial mass previously characterized as a 

meningioma.


4. Atherosclerosis.


- Patient given Zosyn and Clindamycin IV in ER


- increased to norco 10 mg  and Dilaudid IV as needed for pain


- Monitor patient closely


- blood cultures


- urine culture


- continue IV hydration


- continue Zosyn IV


- speech therapy evaluated patient recommending mechanical soft diet with thin 

liquids


- if area consolidates may request I&D per IR


- may also consult ENT if this does not improve with fluids and IV abx





DVT prophylaxis with SCDs





(2) Cellulitis of face


ICD Codes:  L03.211 - Cellulitis of face


Status:  Acute


Plan:  see above





(3) Nausea & vomiting


ICD Codes:  R11.2 - Nausea with vomiting, unspecified


Status:  Resolved


Plan:  - Zofran needed


- supportive care





(4) UTI (urinary tract infection)


ICD Codes:  N39.0 - Urinary tract infection, site not specified


Plan:  UA reviewed consistent with UTI


patient on Zosyn which will also cover UTI


await cultures results





(5) Hyponatremia


ICD Codes:  E87.1 - Hypo-osmolality and hyponatremia


Status:  Acute


Plan:  Na 130 on admission -> (12/11) 134


likely secondary to poor PO intake with encourage PO intake 


continue IV fluids 


encourage PO intake


recheck in AM





(6) Hypothyroidism


ICD Codes:  E03.9 - Hypothyroidism, unspecified


Status:  Chronic


Plan:  - Cont. home meds





(7) Hyperlipidemia


ICD Codes:  E78.5 - Hyperlipidemia, unspecified


Status:  Chronic


Plan:  - Cont. home meds





(8) Depression


ICD Codes:  F32.9 - Major depressive disorder, single episode, unspecified


Status:  Chronic


Plan:  - Cont. home meds





12/11/17 Patient's daughter Sandrita Meier updated over the phone [phone]





(2) Cellulitis of face


ICD Codes:  L03.211 - Cellulitis of face


Status:  Acute


Plan:  see above





(3) Nausea & vomiting


ICD Codes:  R11.2 - Nausea with vomiting, unspecified


Status:  Resolved


Plan:  see above





(4) UTI (urinary tract infection)


ICD Codes:  N39.0 - Urinary tract infection, site not specified


Plan:  see above





(5) Hyponatremia


ICD Codes:  E87.1 - Hypo-osmolality and hyponatremia


Status:  Acute


Plan:  see above





(6) Hypothyroidism


ICD Codes:  E03.9 - Hypothyroidism, unspecified


Status:  Chronic


Plan:  see above





(7) Hyperlipidemia


ICD Codes:  E78.5 - Hyperlipidemia, unspecified


Status:  Chronic


Plan:  - Cont. home meds





(8) Depression


ICD Codes:  F32.9 - Major depressive disorder, single episode, unspecified


Status:  Chronic


Plan:  see above





Assessment and Plan


Patient examined.


Assessment and plan formulated with Hafsa Bolanos PA-C.


I agree with the above.


left parotitis. improved redness/swelling.


will cont zosyn today. If progress stops then will likely consult


ENT. pain control.











Hafsa Bolanos Dec 11, 2017 11:06


Jermaine Caldwell MD Dec 11, 2017 14:52

## 2017-12-12 VITALS
TEMPERATURE: 98.3 F | OXYGEN SATURATION: 96 % | SYSTOLIC BLOOD PRESSURE: 174 MMHG | RESPIRATION RATE: 18 BRPM | HEART RATE: 95 BPM | DIASTOLIC BLOOD PRESSURE: 72 MMHG

## 2017-12-12 VITALS
OXYGEN SATURATION: 96 % | TEMPERATURE: 98.8 F | HEART RATE: 80 BPM | DIASTOLIC BLOOD PRESSURE: 80 MMHG | SYSTOLIC BLOOD PRESSURE: 130 MMHG | RESPIRATION RATE: 20 BRPM

## 2017-12-12 VITALS
TEMPERATURE: 98.2 F | SYSTOLIC BLOOD PRESSURE: 184 MMHG | DIASTOLIC BLOOD PRESSURE: 76 MMHG | RESPIRATION RATE: 18 BRPM | HEART RATE: 98 BPM | OXYGEN SATURATION: 94 %

## 2017-12-12 VITALS
TEMPERATURE: 98.1 F | OXYGEN SATURATION: 95 % | DIASTOLIC BLOOD PRESSURE: 72 MMHG | HEART RATE: 88 BPM | RESPIRATION RATE: 16 BRPM | SYSTOLIC BLOOD PRESSURE: 129 MMHG

## 2017-12-12 VITALS
SYSTOLIC BLOOD PRESSURE: 129 MMHG | TEMPERATURE: 98.2 F | RESPIRATION RATE: 18 BRPM | HEART RATE: 98 BPM | DIASTOLIC BLOOD PRESSURE: 58 MMHG | OXYGEN SATURATION: 95 %

## 2017-12-12 RX ADMIN — BUPROPION HYDROCHLORIDE SCH MG: 150 TABLET, FILM COATED ORAL at 08:18

## 2017-12-12 RX ADMIN — Medication SCH ML: at 20:15

## 2017-12-12 RX ADMIN — TAZOBACTAM SODIUM AND PIPERACILLIN SODIUM SCH MLS/HR: 375; 3 INJECTION, SOLUTION INTRAVENOUS at 20:14

## 2017-12-12 RX ADMIN — PHENYTOIN SODIUM SCH MLS/HR: 50 INJECTION INTRAMUSCULAR; INTRAVENOUS at 06:15

## 2017-12-12 RX ADMIN — HYDROCODONE BITARTRATE AND ACETAMINOPHEN PRN TAB: 10; 325 TABLET ORAL at 12:15

## 2017-12-12 RX ADMIN — HYDROCODONE BITARTRATE AND ACETAMINOPHEN PRN TAB: 10; 325 TABLET ORAL at 20:15

## 2017-12-12 RX ADMIN — PRAVASTATIN SODIUM SCH MG: 40 TABLET ORAL at 20:15

## 2017-12-12 RX ADMIN — PANTOPRAZOLE SODIUM SCH MG: 20 TABLET, DELAYED RELEASE ORAL at 08:18

## 2017-12-12 RX ADMIN — LEVOTHYROXINE SODIUM SCH MCG: 100 TABLET ORAL at 04:54

## 2017-12-12 RX ADMIN — Medication SCH ML: at 08:15

## 2017-12-12 RX ADMIN — BUPROPION HYDROCHLORIDE SCH MG: 150 TABLET, FILM COATED ORAL at 20:15

## 2017-12-12 RX ADMIN — TAZOBACTAM SODIUM AND PIPERACILLIN SODIUM SCH MLS/HR: 375; 3 INJECTION, SOLUTION INTRAVENOUS at 12:15

## 2017-12-12 RX ADMIN — OXYBUTYNIN CHLORIDE SCH MG: 5 TABLET ORAL at 20:15

## 2017-12-12 RX ADMIN — OXYBUTYNIN CHLORIDE SCH MG: 5 TABLET ORAL at 08:18

## 2017-12-12 RX ADMIN — TAZOBACTAM SODIUM AND PIPERACILLIN SODIUM SCH MLS/HR: 375; 3 INJECTION, SOLUTION INTRAVENOUS at 04:55

## 2017-12-12 RX ADMIN — PHENYTOIN SODIUM SCH MLS/HR: 50 INJECTION INTRAMUSCULAR; INTRAVENOUS at 19:35

## 2017-12-12 NOTE — HHI.PR
Subjective


Remarks


confused. angry. paranoid.


upset about the left forearm iv...says she


believes it will cause her to lose the arm.


describes frequent urination.





Objective


Vitals


agitated


angry.


left parotid gland swelling..seems less


 tender to palpation. and erythema on skin


 much better.


no labored breathing


heart reg


lung cta


abd s/nt


ext no pitting


left arm iv site. no redness or swelling


Vital Signs








  Date Time  Temp Pulse Resp B/P (MAP) Pulse Ox O2 Delivery O2 Flow Rate FiO2


 


12/12/17 04:00 98.2 98 18 184/76 (112) 94   


 


12/12/17 00:00 98.2 98 18 129/58 (81) 95   


 


12/11/17 20:30 97.9 87 18 128/86 (100) 95   


 


12/11/17 17:03 98.0 75 20 118/55 (76) 98   


 


12/11/17 11:56 98.3 85 20 143/60 (87) 95   








Result Diagram:  


12/11/17 0715                                                                  

              12/11/17 0715





Imaging





Last Impressions








Chest X-Ray 12/10/17 0237 Signed





Impressions: 





 Service Date/Time:  Hari, December 10, 2017 03:01 - CONCLUSION: No acute 





 disease.       Tobi Alan MD 


 


Neck CT 12/9/17 4952 Signed





Impressions: 





 Service Date/Time:  Hari, December 10, 2017 00:32 - CONCLUSION:  1. Abnormal 





 stranding of the subcutaneous fat and skin thickening left neck and parotid 





 region with asymmetric enhancement of the left parotid as compared to the 

right. 





 The findings would be characteristic of a parotitis and cellulitis. 2. Mildly 





 distended, fluid-filled esophagus is present. 3. Extra-axial left frontal 





 intracranial mass previously characterized as a meningioma. 4. 

Atherosclerosis.  





    Tobi Alan MD 











A/P


Problem List:  


(1) Parotitis, acute


ICD Codes:  K11.21 - Acute sialoadenitis


Status:  Acute


Plan:  (1) Parotitis, acute


ICD Codes:  K11.21 - Acute sialoadenitis


Status:  Acute


Plan:  Patient had noticed progressively worsening redness and swelling to the 

left side of the face and neck.  Patient has noted some soreness with 

swallowing but no difficulty swallowing or handling her oral secretions.  

Patient denies any injury.  Family members have noticed increasing swelling 

significant this evening with associated generalized weakness, nausea and 

vomiting.


- Neck CT reviewed and reveals:   


1. Abnormal stranding of the subcutaneous fat and skin thickening left neck and 

parotid region with asymmetric enhancement of the left parotid as compared to 

the right. The findings would be characteristic of a parotitis and cellulitis.


2. Mildly distended, fluid-filled esophagus is present.


3. Extra-axial left frontal intracranial mass previously characterized as a 

meningioma.


4. Atherosclerosis.





left parotitis appears clinically improved over past 48hrs with iv zosyn. will 

monitor. ENT if needed.


esbl ecoli positive urine which was ordered by ED...?consider colonization. 

will pt's agitation


  unclear if this is symptomatic. no fever or leukocytosis


ID consulted


Daughter updated yesterday. Will ask RN to call me when family arrives today 

for update as pt more agitated and paranoid.








(2) Cellulitis of face


ICD Codes:  L03.211 - Cellulitis of face


Status:  Acute


Plan:  see above





(3) Nausea & vomiting


ICD Codes:  R11.2 - Nausea with vomiting, unspecified


Status:  Resolved


Plan:  - Zofran needed


- supportive care





(4) UTI (urinary tract infection)


ICD Codes:  N39.0 - Urinary tract infection, site not specified


above





(5) Hyponatremia


ICD Codes:  E87.1 - Hypo-osmolality and hyponatremia


Status:  Acute


Plan:  Na 130 on admission -> (12/11) 134


likely secondary to poor PO intake with encourage PO intake 


continue IV fluids 








(6) Hypothyroidism


ICD Codes:  E03.9 - Hypothyroidism, unspecified


Status:  Chronic


Plan:  - Cont. home meds





(7) Hyperlipidemia


ICD Codes:  E78.5 - Hyperlipidemia, unspecified


Status:  Chronic


Plan:  - Cont. home meds





(8) Depression


ICD Codes:  F32.9 - Major depressive disorder, single episode, unspecified


Status:  Chronic


Plan:  - Cont. home meds





12/11/17 Patient's daughter Sandrita Meier updated over the phone [phone]





(2) Cellulitis of face


ICD Codes:  L03.211 - Cellulitis of face


Status:  Acute


Plan:  see above





(3) Nausea & vomiting


ICD Codes:  R11.2 - Nausea with vomiting, unspecified


Status:  Resolved


Plan:  see above





(4) UTI (urinary tract infection)


ICD Codes:  N39.0 - Urinary tract infection, site not specified


Plan:  see above





(5) Hyponatremia


ICD Codes:  E87.1 - Hypo-osmolality and hyponatremia


Status:  Acute


Plan:  see above





(6) Hypothyroidism


ICD Codes:  E03.9 - Hypothyroidism, unspecified


Status:  Chronic


Plan:  see above





(7) Hyperlipidemia


ICD Codes:  E78.5 - Hyperlipidemia, unspecified


Status:  Chronic


Plan:  - Cont. home meds





(8) Depression


ICD Codes:  F32.9 - Major depressive disorder, single episode, unspecified


Status:  Chronic


Plan:  see above














Jermaine Caldwell MD Dec 12, 2017 09:44

## 2017-12-13 VITALS
DIASTOLIC BLOOD PRESSURE: 80 MMHG | HEART RATE: 80 BPM | TEMPERATURE: 98.8 F | RESPIRATION RATE: 18 BRPM | OXYGEN SATURATION: 98 % | SYSTOLIC BLOOD PRESSURE: 132 MMHG

## 2017-12-13 VITALS
DIASTOLIC BLOOD PRESSURE: 70 MMHG | TEMPERATURE: 97.9 F | SYSTOLIC BLOOD PRESSURE: 180 MMHG | RESPIRATION RATE: 18 BRPM | HEART RATE: 88 BPM | OXYGEN SATURATION: 98 %

## 2017-12-13 VITALS
TEMPERATURE: 97.6 F | SYSTOLIC BLOOD PRESSURE: 128 MMHG | DIASTOLIC BLOOD PRESSURE: 75 MMHG | HEART RATE: 76 BPM | OXYGEN SATURATION: 97 % | RESPIRATION RATE: 19 BRPM

## 2017-12-13 VITALS
DIASTOLIC BLOOD PRESSURE: 79 MMHG | SYSTOLIC BLOOD PRESSURE: 150 MMHG | HEART RATE: 90 BPM | RESPIRATION RATE: 18 BRPM | TEMPERATURE: 98 F | OXYGEN SATURATION: 98 %

## 2017-12-13 VITALS
HEART RATE: 19 BPM | OXYGEN SATURATION: 96 % | TEMPERATURE: 98.5 F | SYSTOLIC BLOOD PRESSURE: 180 MMHG | RESPIRATION RATE: 19 BRPM | DIASTOLIC BLOOD PRESSURE: 74 MMHG

## 2017-12-13 LAB
ANION GAP SERPL CALC-SCNC: 8 MEQ/L (ref 5–15)
BASOPHILS # BLD AUTO: 0.1 TH/MM3 (ref 0–0.2)
BASOPHILS NFR BLD: 0.8 % (ref 0–2)
BUN SERPL-MCNC: 3 MG/DL (ref 7–18)
CHLORIDE SERPL-SCNC: 104 MEQ/L (ref 98–107)
EOSINOPHIL # BLD: 0.2 TH/MM3 (ref 0–0.4)
EOSINOPHIL NFR BLD: 3.3 % (ref 0–4)
ERYTHROCYTE [DISTWIDTH] IN BLOOD BY AUTOMATED COUNT: 15.3 % (ref 11.6–17.2)
GFR SERPLBLD BASED ON 1.73 SQ M-ARVRAT: 81 ML/MIN (ref 89–?)
HCO3 BLD-SCNC: 26.3 MEQ/L (ref 21–32)
HCT VFR BLD CALC: 34.1 % (ref 35–46)
HEMO FLAGS: (no result)
LYMPHOCYTES # BLD AUTO: 1.3 TH/MM3 (ref 1–4.8)
LYMPHOCYTES NFR BLD AUTO: 19.9 % (ref 9–44)
MCH RBC QN AUTO: 27 PG (ref 27–34)
MCHC RBC AUTO-ENTMCNC: 33 % (ref 32–36)
MCV RBC AUTO: 81.9 FL (ref 80–100)
MONOCYTES NFR BLD: 7.1 % (ref 0–8)
NEUTROPHILS # BLD AUTO: 4.5 TH/MM3 (ref 1.8–7.7)
NEUTROPHILS NFR BLD AUTO: 68.9 % (ref 16–70)
PLATELET # BLD: 175 TH/MM3 (ref 150–450)
POTASSIUM SERPL-SCNC: 3.5 MEQ/L (ref 3.5–5.1)
RBC # BLD AUTO: 4.17 MIL/MM3 (ref 4–5.3)
SODIUM SERPL-SCNC: 138 MEQ/L (ref 136–145)
WBC # BLD AUTO: 6.5 TH/MM3 (ref 4–11)

## 2017-12-13 RX ADMIN — Medication SCH ML: at 08:31

## 2017-12-13 RX ADMIN — TAZOBACTAM SODIUM AND PIPERACILLIN SODIUM SCH MLS/HR: 375; 3 INJECTION, SOLUTION INTRAVENOUS at 14:19

## 2017-12-13 RX ADMIN — PHENYTOIN SODIUM SCH MLS/HR: 50 INJECTION INTRAMUSCULAR; INTRAVENOUS at 08:31

## 2017-12-13 RX ADMIN — TAZOBACTAM SODIUM AND PIPERACILLIN SODIUM SCH MLS/HR: 375; 3 INJECTION, SOLUTION INTRAVENOUS at 05:15

## 2017-12-13 RX ADMIN — OXYBUTYNIN CHLORIDE SCH MG: 5 TABLET ORAL at 08:41

## 2017-12-13 RX ADMIN — TAZOBACTAM SODIUM AND PIPERACILLIN SODIUM SCH MLS/HR: 375; 3 INJECTION, SOLUTION INTRAVENOUS at 20:58

## 2017-12-13 RX ADMIN — Medication SCH ML: at 20:55

## 2017-12-13 RX ADMIN — PANTOPRAZOLE SODIUM SCH MG: 20 TABLET, DELAYED RELEASE ORAL at 08:41

## 2017-12-13 RX ADMIN — BUPROPION HYDROCHLORIDE SCH MG: 150 TABLET, FILM COATED ORAL at 08:40

## 2017-12-13 RX ADMIN — LEVOTHYROXINE SODIUM SCH MCG: 100 TABLET ORAL at 05:15

## 2017-12-13 RX ADMIN — BUPROPION HYDROCHLORIDE SCH MG: 150 TABLET, FILM COATED ORAL at 20:56

## 2017-12-13 RX ADMIN — HYDROCODONE BITARTRATE AND ACETAMINOPHEN PRN TAB: 10; 325 TABLET ORAL at 12:04

## 2017-12-13 RX ADMIN — HYDROCODONE BITARTRATE AND ACETAMINOPHEN PRN TAB: 10; 325 TABLET ORAL at 05:16

## 2017-12-13 RX ADMIN — PRAVASTATIN SODIUM SCH MG: 40 TABLET ORAL at 20:57

## 2017-12-13 RX ADMIN — OXYBUTYNIN CHLORIDE SCH MG: 5 TABLET ORAL at 20:57

## 2017-12-13 RX ADMIN — HYDROCODONE BITARTRATE AND ACETAMINOPHEN PRN TAB: 10; 325 TABLET ORAL at 20:57

## 2017-12-13 NOTE — HHI.PR
Addendum to Inpatient Note


Additional Information


Pt seen examined


labs reviewed


dw RN


full note  to follow











Mirta Ward MD Dec 13, 2017 16:34

## 2017-12-13 NOTE — HHI.PR
Subjective


Remarks


more oriented today and less agitated





Objective


Vitals


left parotid tenderness and swelling much better


 but parotid still palpable. overlying skin redness better


right forearm iv site. some swelling proximally..wrapped





Vital Signs








  Date Time  Temp Pulse Resp B/P (MAP) Pulse Ox O2 Delivery O2 Flow Rate FiO2


 


12/13/17 08:57 98.5 19 19 180/74 (109) 96   


 


12/13/17 06:31   18     


 


12/13/17 04:45 98.0 90 18 150/79 (102) 98   


 


12/13/17 00:30 97.6 76 19 128/75 (92) 97   


 


12/12/17 21:00 98.8 80 20 130/80 (97) 96   


 


12/12/17 12:00 98.3 95 18 174/72 (106) 96   








Result Diagram:  


12/13/17 0840                                                                  

              12/13/17 0840





Imaging





Last Impressions








Chest X-Ray 12/10/17 0237 Signed





Impressions: 





 Service Date/Time:  Hari, December 10, 2017 03:01 - CONCLUSION: No acute 





 disease.       Tobi Alan MD 


 


Neck CT 12/9/17 2335 Signed





Impressions: 





 Service Date/Time:  Hari, December 10, 2017 00:32 - CONCLUSION:  1. Abnormal 





 stranding of the subcutaneous fat and skin thickening left neck and parotid 





 region with asymmetric enhancement of the left parotid as compared to the 

right. 





 The findings would be characteristic of a parotitis and cellulitis. 2. Mildly 





 distended, fluid-filled esophagus is present. 3. Extra-axial left frontal 





 intracranial mass previously characterized as a meningioma. 4. 

Atherosclerosis.  





    Tobi Alan MD 











A/P


Problem List:  


(1) Parotitis, acute


ICD Codes:  K11.21 - Acute sialoadenitis


Status:  Acute


Plan:  (1) Parotitis, acute


ICD Codes:  K11.21 - Acute sialoadenitis


Status:  Acute


Plan:  Patient had noticed progressively worsening redness and swelling to the 

left side of the face and neck.  Patient has noted some soreness with 

swallowing but no difficulty swallowing or handling her oral secretions.  

Patient denies any injury.  Family members have noticed increasing swelling 

significant this evening with associated generalized weakness, nausea and 

vomiting.


- Neck CT reviewed and reveals:   


1. Abnormal stranding of the subcutaneous fat and skin thickening left neck and 

parotid region with asymmetric enhancement of the left parotid as compared to 

the right. The findings would be characteristic of a parotitis and cellulitis.


2. Mildly distended, fluid-filled esophagus is present.


3. Extra-axial left frontal intracranial mass previously characterized as a 

meningioma.


4. Atherosclerosis.





left parotitis appears clinically improved over past 48hrs with iv zosyn. will 

monitor. ENT if needed.


esbl ecoli positive urine which was ordered by ED...?consider colonization. 

with pt's agitation


  unclear if this is symptomatic. no fever or leukocytosis...ID consulted for 

opinion.


Daughter updated yesterday. plan will be for d/c home tomorrow if stable.....








(2) Cellulitis of face


ICD Codes:  L03.211 - Cellulitis of face


Status:  Acute


Plan:  see above





(3) Nausea & vomiting


ICD Codes:  R11.2 - Nausea with vomiting, unspecified


Status:  Resolved


Plan:  - Zofran needed


- supportive care





(4) UTI (urinary tract infection)


ICD Codes:  N39.0 - Urinary tract infection, site not specified


above





(5) Hyponatremia


ICD Codes:  E87.1 - Hypo-osmolality and hyponatremia


Status:  Acute


Plan:  Na 130 on admission -> (12/11) 134


likely secondary to poor PO intake with encourage PO intake 


continue IV fluids 








(6) Hypothyroidism


ICD Codes:  E03.9 - Hypothyroidism, unspecified


Status:  Chronic


Plan:  - Cont. home meds





(7) Hyperlipidemia


ICD Codes:  E78.5 - Hyperlipidemia, unspecified


Status:  Chronic


Plan:  - Cont. home meds





(8) Depression


ICD Codes:  F32.9 - Major depressive disorder, single episode, unspecified


Status:  Chronic


Plan:  - Cont. home meds





(2) Cellulitis of face


ICD Codes:  L03.211 - Cellulitis of face


Status:  Acute


Plan:  see above





(3) Nausea & vomiting


ICD Codes:  R11.2 - Nausea with vomiting, unspecified


Status:  Resolved


Plan:  see above





(4) UTI (urinary tract infection)


ICD Codes:  N39.0 - Urinary tract infection, site not specified


Plan:  see above





(5) Hyponatremia


ICD Codes:  E87.1 - Hypo-osmolality and hyponatremia


Status:  Acute


Plan:  see above





(6) Hypothyroidism


ICD Codes:  E03.9 - Hypothyroidism, unspecified


Status:  Chronic


Plan:  see above





(7) Hyperlipidemia


ICD Codes:  E78.5 - Hyperlipidemia, unspecified


Status:  Chronic


Plan:  - Cont. home meds





(8) Depression


ICD Codes:  F32.9 - Major depressive disorder, single episode, unspecified


Status:  Chronic


Plan:  see above














Jermaine Caldwell MD Dec 13, 2017 11:14

## 2017-12-13 NOTE — PD.ID.CON
History of Present Illness


Service


ID


Consult Requested By


Dr Caldwell


Reason for Consult


parotitis , ESBL+  org in urine


Primary Care Physician


Laurie Mercedes MD


Diagnoses:  


History of Present Illness


89 yo femela with dementia, quite confused


presented to the hospital with  chief complaint of painful L cheek lump


On presentation afebrile, nl WBC


UA was done and has pyuria of 28  , urine clx positive with ESBL + E.coli





Review of Systems


ROS Limitations:  Poor Historian





Past Family Social History


Allergies:  


Coded Allergies:  


     asparagus (Unverified  Allergy, Severe, sob, 12/9/17)


     bean pod (Unverified  Allergy, Severe, sob, 12/9/17)


     duloxetine (Unverified  Allergy, Unknown, 12/9/17)


     rivaroxaban (Unverified  Allergy, Unknown, 12/9/17)


     trazodone (Unverified  Allergy, Unknown, 12/9/17)


Past Medical History


Hyperlipidemia


Hypothyroidism


IBS


Depression


Anxiety


Fibromyalgia


DDD


Osteoarthritis


RLS


Past Surgical History





Appendectomy


Cholecystectomy


Rectocele repair


Left shoulder fracture repair


AVI with BSO


Cervical spine nerve block


Active Ordered Medications


Medications where reviewed in EMR


Antibiotics Include:  zosyn


Family History


reviewed


Non-Contributory.


Social History


h/o Tobacco.


No ETOH.


No Illicit Drugs.





Physical Exam


Vital Signs





Vital Signs








  Date Time  Temp Pulse Resp B/P (MAP) Pulse Ox O2 Delivery O2 Flow Rate FiO2


 


12/13/17 12:48 97.9 88 18 180/70 (106) 98   


 


12/13/17 08:57 98.5 19 19 180/74 (109) 96   


 


12/13/17 06:31   18     


 


12/13/17 04:45 98.0 90 18 150/79 (102) 98   


 


12/13/17 00:30 97.6 76 19 128/75 (92) 97   








Physical Exam


CONSTITUTIONAL/GENERAL: This is an adequately nourished patient, in no apparent 

distress.


TUBES/LINES/DRAINS:


SKIN: No jaundice, rashes, or lesions.  . Skin temperature appropriate. Not 

diaphoretic. 


HEAD: Atraumatic. Normocephalic.


EYES: Pupils equal and round and reactive. Extraocular motions intact. No 

scleral icterus. No injection or drainage. Fundi not examined.


ENT: Hearing grossly normal. Nose without bleeding or purulent drainage. Throat 

without visible erythema, exudates, masses, or lesions.


Pt has a 5 cm lump on L parotid area, its mildly tender, non fluctuant


NECK: Trachea midline. Supple, nontender.  


CARDIOVASCULAR: Regular rate and rhythm without murmurs, gallops, or rubs. No 

JVD. Peripheral pulses symmetric.


RESPIRATORY/CHEST: Symmetric, unlabored respirations. Clear to auscultation. 

Breath sounds equal bilaterally. No wheezes, rales, or rhonchi.  


GASTROINTESTINAL: Abdomen soft, non-tender, nondistended. No hepato-splenomegaly

, or palpable masses. No guarding. Bowel sounds present.


GENITOURINARY: Without palpable bladder distension 


MUSCULOSKELETAL: Extremities without clubbing, cyanosis, or edema. No joint 

tenderness or effusion noted. No calf tenderness. No mottling or clubbing.


LYMPHATICS: No palpable cervical or supraclavicular adenopathy.


NEUROLOGICAL: Awake and alert. Motor and sensory grossly within normal limits. 

Follows commands. COnfused  Moves all extremities.


PSYCHIATRIC: No obvious anxiety/depression. no apparent hallucinations or other 

psychotic thought process.


Laboratory





Laboratory Tests








Test


  12/13/17


08:40


 


White Blood Count 6.5 


 


Red Blood Count 4.17 


 


Hemoglobin 11.3 


 


Hematocrit 34.1 


 


Mean Corpuscular Volume 81.9 


 


Mean Corpuscular Hemoglobin 27.0 


 


Mean Corpuscular Hemoglobin


Concent 33.0 


 


 


Red Cell Distribution Width 15.3 


 


Platelet Count 175 


 


Mean Platelet Volume 8.9 


 


Neutrophils (%) (Auto) 68.9 


 


Lymphocytes (%) (Auto) 19.9 


 


Monocytes (%) (Auto) 7.1 


 


Eosinophils (%) (Auto) 3.3 


 


Basophils (%) (Auto) 0.8 


 


Neutrophils # (Auto) 4.5 


 


Lymphocytes # (Auto) 1.3 


 


Monocytes # (Auto) 0.5 


 


Eosinophils # (Auto) 0.2 


 


Basophils # (Auto) 0.1 


 


CBC Comment DIFF FINAL 


 


Differential Comment  


 


Blood Urea Nitrogen 3 


 


Creatinine 0.68 


 


Random Glucose 121 


 


Calcium Level 8.4 


 


Sodium Level 138 


 


Potassium Level 3.5 


 


Chloride Level 104 


 


Carbon Dioxide Level 26.3 


 


Anion Gap 8 


 


Estimat Glomerular Filtration


Rate 81 


 














 Date/Time


Source Procedure


Growth Status


 


 


 12/9/17 23:41


Blood Peripheral Aerobic Blood Culture - Preliminary


NO GROWTH IN 3 DAYS Resulted


 


 12/9/17 23:41


Blood Peripheral Anaerobic Blood Culture - Preliminary


NO GROWTH IN 3 DAYS Resulted


 


 12/10/17 01:30


Urine Catheterized Urine Urine Culture - Final


Escherichia Coli Esbl Positive Complete








Result Diagram:  


12/13/17 0840                                                                  

              12/13/17 0840





Imaging





Last Impressions








Chest X-Ray 12/10/17 0237 Signed





Impressions: 





 Service Date/Time:  Hari, December 10, 2017 03:01 - CONCLUSION: No acute 





 disease.       Tobi Alan MD 


 


Neck CT 12/9/17 2335 Signed





Impressions: 





 Service Date/Time:  Hari, December 10, 2017 00:32 - CONCLUSION:  1. Abnormal 





 stranding of the subcutaneous fat and skin thickening left neck and parotid 





 region with asymmetric enhancement of the left parotid as compared to the 

right. 





 The findings would be characteristic of a parotitis and cellulitis. 2. Mildly 





 distended, fluid-filled esophagus is present. 3. Extra-axial left frontal 





 intracranial mass previously characterized as a meningioma. 4. 

Atherosclerosis.  





    Tobi Alan MD 











Assessment and Plan


Assessment and Plan


Acute bacterial L sided parotits, 


   staph including MRSA most commonly involved org


ESBL + E .coli  UTI





   - dc zosyn


   - start Ertapenem


   - add vancomycin











Mirta Ward MD Dec 13, 2017 22:34

## 2017-12-14 VITALS
DIASTOLIC BLOOD PRESSURE: 66 MMHG | TEMPERATURE: 98.8 F | HEART RATE: 88 BPM | SYSTOLIC BLOOD PRESSURE: 125 MMHG | OXYGEN SATURATION: 98 % | RESPIRATION RATE: 19 BRPM

## 2017-12-14 VITALS
SYSTOLIC BLOOD PRESSURE: 155 MMHG | RESPIRATION RATE: 20 BRPM | TEMPERATURE: 97.7 F | DIASTOLIC BLOOD PRESSURE: 81 MMHG | OXYGEN SATURATION: 96 % | HEART RATE: 115 BPM

## 2017-12-14 VITALS
OXYGEN SATURATION: 99 % | TEMPERATURE: 98 F | HEART RATE: 88 BPM | DIASTOLIC BLOOD PRESSURE: 88 MMHG | SYSTOLIC BLOOD PRESSURE: 120 MMHG | RESPIRATION RATE: 19 BRPM

## 2017-12-14 VITALS — SYSTOLIC BLOOD PRESSURE: 148 MMHG | DIASTOLIC BLOOD PRESSURE: 75 MMHG

## 2017-12-14 RX ADMIN — Medication SCH ML: at 08:40

## 2017-12-14 RX ADMIN — LEVOTHYROXINE SODIUM SCH MCG: 100 TABLET ORAL at 05:37

## 2017-12-14 RX ADMIN — Medication SCH ML: at 21:22

## 2017-12-14 RX ADMIN — ERTAPENEM SODIUM SCH MLS/HR: 1 INJECTION, POWDER, LYOPHILIZED, FOR SOLUTION INTRAMUSCULAR; INTRAVENOUS at 23:57

## 2017-12-14 RX ADMIN — OXYBUTYNIN CHLORIDE SCH MG: 5 TABLET ORAL at 21:00

## 2017-12-14 RX ADMIN — ERTAPENEM SODIUM SCH MLS/HR: 1 INJECTION, POWDER, LYOPHILIZED, FOR SOLUTION INTRAMUSCULAR; INTRAVENOUS at 01:27

## 2017-12-14 RX ADMIN — BUPROPION HYDROCHLORIDE SCH MG: 150 TABLET, FILM COATED ORAL at 21:22

## 2017-12-14 RX ADMIN — HYDROCODONE BITARTRATE AND ACETAMINOPHEN PRN TAB: 10; 325 TABLET ORAL at 01:23

## 2017-12-14 RX ADMIN — PRAVASTATIN SODIUM SCH MG: 40 TABLET ORAL at 21:22

## 2017-12-14 RX ADMIN — PANTOPRAZOLE SODIUM SCH MG: 20 TABLET, DELAYED RELEASE ORAL at 08:41

## 2017-12-14 RX ADMIN — HYDROCODONE BITARTRATE AND ACETAMINOPHEN PRN TAB: 10; 325 TABLET ORAL at 21:22

## 2017-12-14 RX ADMIN — OXYBUTYNIN CHLORIDE SCH MG: 5 TABLET ORAL at 08:41

## 2017-12-14 RX ADMIN — BUPROPION HYDROCHLORIDE SCH MG: 150 TABLET, FILM COATED ORAL at 08:41

## 2017-12-14 NOTE — HHI.PR
Subjective


Remarks


no new complaints


eager for d/c home.





Objective


Vitals


left parotid gland enlarged..but significantly smaller


 with less tenderness and redness resolved


upper ext's ecchymosis


heart reg


lung cta





Vital Signs








  Date Time  Temp Pulse Resp B/P (MAP) Pulse Ox O2 Delivery O2 Flow Rate FiO2


 


12/14/17 08:52    148/75 (99)    


 


12/14/17 05:00 98.0 88 19 120/88 (99) 99   


 


12/14/17 00:30 98.8 88 19 125/66 (85) 98   


 


12/13/17 20:30 98.8 80 18 132/80 (97) 98   


 


12/13/17 12:48 97.9 88 18 180/70 (106) 98   














 12/14/17 12/14/17 12/15/17





 15:00 23:00 07:00


 


   


 


# Voids 2  


 


# Bowel Movements 1  








Result Diagram:  


12/13/17 0840                                                                  

              12/13/17 0840





Imaging





Last Impressions








Chest X-Ray 12/10/17 0237 Signed





Impressions: 





 Service Date/Time:  Hari, December 10, 2017 03:01 - CONCLUSION: No acute 





 disease.       Tobi Alan MD 


 


Neck CT 12/9/17 2339 Signed





Impressions: 





 Service Date/Time:  Hari, December 10, 2017 00:32 - CONCLUSION:  1. Abnormal 





 stranding of the subcutaneous fat and skin thickening left neck and parotid 





 region with asymmetric enhancement of the left parotid as compared to the 

right. 





 The findings would be characteristic of a parotitis and cellulitis. 2. Mildly 





 distended, fluid-filled esophagus is present. 3. Extra-axial left frontal 





 intracranial mass previously characterized as a meningioma. 4. 

Atherosclerosis.  





    Tobi Alan MD 











A/P


Problem List:  


(1) Parotitis, acute


ICD Codes:  K11.21 - Acute sialoadenitis


Status:  Acute


Plan:  (1) Parotitis, acute


ICD Codes:  K11.21 - Acute sialoadenitis


Status:  Acute


Plan:  Patient had noticed progressively worsening redness and swelling to the 

left side of the face and neck.  Patient has noted some soreness with 

swallowing but no difficulty swallowing or handling her oral secretions.  

Patient denies any injury.  Family members have noticed increasing swelling 

significant this evening with associated generalized weakness, nausea and 

vomiting.


- Neck CT reviewed and reveals:   


1. Abnormal stranding of the subcutaneous fat and skin thickening left neck and 

parotid region with asymmetric enhancement of the left parotid as compared to 

the right. The findings would be characteristic of a parotitis and cellulitis.


2. Mildly distended, fluid-filled esophagus is present.


3. Extra-axial left frontal intracranial mass previously characterized as a 

meningioma.


4. Atherosclerosis.





left parotitis appears significantly improved from admission


esbl ecoli positive urine which was ordered by ED...


ID consulted to guide abx therapy.


will plan for d/c once abx regimen decided upon.


she will plan for d/c home with c and family.





(2) Cellulitis of face


ICD Codes:  L03.211 - Cellulitis of face


Status:  Acute


Plan:  see above





(3) Nausea & vomiting


ICD Codes:  R11.2 - Nausea with vomiting, unspecified


Status:  Resolved


Plan:  - Zofran needed


- supportive care





(4) UTI (urinary tract infection)


ICD Codes:  N39.0 - Urinary tract infection, site not specified


above





(5) Hyponatremia


ICD Codes:  E87.1 - Hypo-osmolality and hyponatremia


Status:  Acute


Plan:  Na 130 on admission -> (12/11) 134


likely secondary to poor PO intake with encourage PO intake 


off ivf.








(6) Hypothyroidism


ICD Codes:  E03.9 - Hypothyroidism, unspecified


Status:  Chronic


Plan:  - Cont. home meds





(7) Hyperlipidemia


ICD Codes:  E78.5 - Hyperlipidemia, unspecified


Status:  Chronic


Plan:  - Cont. home meds





(8) Depression


ICD Codes:  F32.9 - Major depressive disorder, single episode, unspecified


Status:  Chronic


Plan:  - Cont. home meds





(2) Cellulitis of face


ICD Codes:  L03.211 - Cellulitis of face


Status:  Acute


Plan:  see above





(3) Nausea & vomiting


ICD Codes:  R11.2 - Nausea with vomiting, unspecified


Status:  Resolved


Plan:  see above





(4) UTI (urinary tract infection)


ICD Codes:  N39.0 - Urinary tract infection, site not specified


Plan:  see above





(5) Hyponatremia


ICD Codes:  E87.1 - Hypo-osmolality and hyponatremia


Status:  Acute


Plan:  see above





(6) Hypothyroidism


ICD Codes:  E03.9 - Hypothyroidism, unspecified


Status:  Chronic


Plan:  see above





(7) Hyperlipidemia


ICD Codes:  E78.5 - Hyperlipidemia, unspecified


Status:  Chronic


Plan:  - Cont. home meds





(8) Depression


ICD Codes:  F32.9 - Major depressive disorder, single episode, unspecified


Status:  Chronic


Plan:  see above














Jermaine Caldwell MD Dec 14, 2017 11:54

## 2017-12-15 VITALS
SYSTOLIC BLOOD PRESSURE: 155 MMHG | RESPIRATION RATE: 18 BRPM | HEART RATE: 85 BPM | DIASTOLIC BLOOD PRESSURE: 73 MMHG | TEMPERATURE: 98.2 F | OXYGEN SATURATION: 97 %

## 2017-12-15 VITALS
TEMPERATURE: 98.2 F | SYSTOLIC BLOOD PRESSURE: 122 MMHG | RESPIRATION RATE: 18 BRPM | DIASTOLIC BLOOD PRESSURE: 74 MMHG | OXYGEN SATURATION: 93 % | HEART RATE: 133 BPM

## 2017-12-15 VITALS
HEART RATE: 88 BPM | DIASTOLIC BLOOD PRESSURE: 70 MMHG | RESPIRATION RATE: 20 BRPM | OXYGEN SATURATION: 95 % | TEMPERATURE: 98.1 F | SYSTOLIC BLOOD PRESSURE: 120 MMHG

## 2017-12-15 VITALS
DIASTOLIC BLOOD PRESSURE: 61 MMHG | SYSTOLIC BLOOD PRESSURE: 123 MMHG | RESPIRATION RATE: 18 BRPM | OXYGEN SATURATION: 95 % | HEART RATE: 98 BPM | TEMPERATURE: 97.4 F

## 2017-12-15 VITALS
RESPIRATION RATE: 18 BRPM | HEART RATE: 88 BPM | DIASTOLIC BLOOD PRESSURE: 64 MMHG | SYSTOLIC BLOOD PRESSURE: 132 MMHG | TEMPERATURE: 98 F | OXYGEN SATURATION: 99 %

## 2017-12-15 RX ADMIN — LINEZOLID SCH MG: 600 TABLET, FILM COATED ORAL at 23:06

## 2017-12-15 RX ADMIN — ONDANSETRON PRN MG: 2 INJECTION, SOLUTION INTRAMUSCULAR; INTRAVENOUS at 23:23

## 2017-12-15 RX ADMIN — OXYBUTYNIN CHLORIDE SCH MG: 5 TABLET ORAL at 08:37

## 2017-12-15 RX ADMIN — PANTOPRAZOLE SODIUM SCH MG: 20 TABLET, DELAYED RELEASE ORAL at 08:37

## 2017-12-15 RX ADMIN — OXYBUTYNIN CHLORIDE SCH MG: 5 TABLET ORAL at 21:00

## 2017-12-15 RX ADMIN — Medication SCH ML: at 08:38

## 2017-12-15 RX ADMIN — PRAVASTATIN SODIUM SCH MG: 40 TABLET ORAL at 23:10

## 2017-12-15 RX ADMIN — BUPROPION HYDROCHLORIDE SCH MG: 150 TABLET, FILM COATED ORAL at 08:37

## 2017-12-15 RX ADMIN — HYDROCODONE BITARTRATE AND ACETAMINOPHEN PRN TAB: 10; 325 TABLET ORAL at 08:43

## 2017-12-15 RX ADMIN — LEVOTHYROXINE SODIUM SCH MCG: 100 TABLET ORAL at 05:43

## 2017-12-15 RX ADMIN — Medication SCH ML: at 21:00

## 2017-12-15 RX ADMIN — BUPROPION HYDROCHLORIDE SCH MG: 150 TABLET, FILM COATED ORAL at 23:05

## 2017-12-15 NOTE — HHI.IDPN
Subjective


Subjective


Remarks


pt is doing OK 


co some nausea


no diarhea


no abdominal pain


Antibiotics


omycin


ertapenem


Allergies:  


Coded Allergies:  


     asparagus (Unverified  Allergy, Severe, sob, 12/9/17)


     bean pod (Unverified  Allergy, Severe, sob, 12/9/17)


     duloxetine (Unverified  Allergy, Unknown, 12/9/17)


     rivaroxaban (Unverified  Allergy, Unknown, 12/9/17)


     trazodone (Unverified  Allergy, Unknown, 12/9/17)





Objective


.





Vital Signs








  Date Time  Temp Pulse Resp B/P (MAP) Pulse Ox O2 Delivery O2 Flow Rate FiO2


 


12/15/17 16:06 98.2 133 18 122/74 (90) 93   


 


12/15/17 08:31 98.1 88 20 120/70 (87) 95   


 


12/15/17 05:05 98.2 85 18 155/73 (100) 97   


 


12/15/17 01:05 97.4 98 18 123/61 (81) 95   


 


12/14/17 21:20 97.7 115 20 155/81 (105) 96   














 12/15/17 12/15/17 12/16/17





 15:00 23:00 07:00


 


Intake Total  480 ml 


 


Balance  480 ml 


 


   


 


Intake Oral  480 ml 


 


# Voids 1 4 








Imaging





Last Impressions








Chest X-Ray 12/10/17 0237 Signed





Impressions: 





 Service Date/Time:  Hari, December 10, 2017 03:01 - CONCLUSION: No acute 





 disease.       Tobi Alan MD 


 


Neck CT 12/9/17 0755 Signed





Impressions: 





 Service Date/Time:  Hari, December 10, 2017 00:32 - CONCLUSION:  1. Abnormal 





 stranding of the subcutaneous fat and skin thickening left neck and parotid 





 region with asymmetric enhancement of the left parotid as compared to the 

right. 





 The findings would be characteristic of a parotitis and cellulitis. 2. Mildly 





 distended, fluid-filled esophagus is present. 3. Extra-axial left frontal 





 intracranial mass previously characterized as a meningioma. 4. 

Atherosclerosis.  





    Tobi Alan MD 








Physical Exam


CONSTITUTIONAL/GENERAL: This is an adequately nourished patient, in no apparent 

distress.


TUBES/LINES/DRAINS:


SKIN: No jaundice, rashes, or lesions.  . Skin temperature appropriate. Not 

diaphoretic. 


 EYES: Pupils equal and round and reactive. Extraocular motions intact. No 

scleral icterus. No injection or drainage. Fundi not examined.


ENT: Hearing grossly normal. Nose without bleeding or purulent drainage. Throat 

without visible erythema, exudates, masses, or lesions.


Pt has approx  5 cm firmish lump on L parotid area, non tender, non fluctuant


seems slightly smaller 


NECK: Trachea midline. Supple, nontender.  


 RESPIRATORY/CHEST: Symmetric, unlabored respirations. Clear to auscultation. 

Breath sounds equal bilaterally. No wheezes, rales, or rhonchi.  


GASTROINTESTINAL: Abdomen soft, non-tender, nondistended. No hepato-splenomegaly

, or palpable masses. No guarding. Bowel sounds present.


 MUSCULOSKELETAL: Extremities without clubbing, cyanosis, or edema. No joint 

tenderness or effusion noted. No calf tenderness. No mottling or clubbing.


LYMPHATICS: No palpable cervical or supraclavicular adenopathy.


NEUROLOGICAL: Awake and alert. Motor and sensory grossly within normal limits. 

Follows commands. COnfused  Moves all extremities.


PSYCHIATRIC: calm , cooperative





Assessment & Plan


Remarks


Acute bacterial L sided parotits, 


   staph including MRSA most commonly involved org


ESBL + E .coli  UTI





   - cont Ertapenem x 14 days total 


   - zyvox po top complete 10-14 days of treat ment (including vancomycin time)











Mirta Ward MD Dec 15, 2017 20:05

## 2017-12-15 NOTE — HHI.FF
__________________________________________________





Infusion Therapy


Location of Infusion Therapy:  [facility] IV Infusion Order


Patient Information


Patient Weight


72.6 kg


Diagnosis:  


Coded Allergies:  


     asparagus (Unverified  Allergy, Severe, sob, 12/9/17)


     bean pod (Unverified  Allergy, Severe, sob, 12/9/17)


     duloxetine (Unverified  Allergy, Unknown, 12/9/17)


     rivaroxaban (Unverified  Allergy, Unknown, 12/9/17)


     trazodone (Unverified  Allergy, Unknown, 12/9/17)





Administer Medication


Ertapenem


1 gram IV


q 24 hours


Start Treatment:  Dec 16, 2017


Stop Treatment:  Dec 26, 2017





Additional Information


Venous access:  Other (midline)


Additional Instructions





[x] Peripheral flush and dressing changes per protocol


[x] Implanted port and central :


* Implanted port: 10 ml Normal Saline followed by 5 ml Heparin 100 units/ml 

Heparin flush


   after each use and monthly to maintain.


   [] May leave port accessed during therapy.


   [] May leave peripheral site accessed for duration of therapy.





[x] If patient has SOB or respiratory distress, check oxygen saturation. If 

less than 90% or clinical signs of respiratory distress, administer oxygen at 2 

L/min. via nasal cannula and notify physician.





[x] Anaphylaxis/Reaction orders:


* Stop infusion.


* Keep IV line open with saline flush.


* Notify physician.


* Monitor vital signs every 15 minutes until symptoms resolve.


* Check Oxygen saturation; Oxygen at 2 L/min. via nasal cannula if less than 90%

 or clinical signs of respiratory distress.


* Administer diphenhydramine (Benadryl) 25 mg IV STAT, (unless patient has 

received as pre-med). May repeat once, if necessary.


* Solu-Cortef 250 mg IVP over 30-60 seconds, use 100 mg vials for each 

dissolution.


* Epinephrine (1mg/1 ml) 0.3 mg subcutaneously or IVP now with any signs of 

respiratory distress.


* Check with physician for new additional pre-med orders if patient is re-

challenged or re-treated.


[x] May remove PICC line when treatment complete, after confirming with 

Physician.


[x] If the patient is admitted to the hospital, the ED, or transferred via [facility]

, complete transfer form including medication reconciliation order sheet.





Laboratory Tests


Weekly Labs:  BMP, CBC w/diff











Mirta Ward MD Dec 15, 2017 20:12

## 2017-12-15 NOTE — HHI.PR
Subjective


Remarks


eager for d/c home.





Objective


Vitals


nad


left parotid swelling much improved. mild tenderness


abd s/nt


ext no pitting.


Vital Signs








  Date Time  Temp Pulse Resp B/P (MAP) Pulse Ox O2 Delivery O2 Flow Rate FiO2


 


12/15/17 08:31 98.1 88 20 120/70 (87) 95   


 


12/15/17 05:05 98.2 85 18 155/73 (100) 97   


 


12/15/17 01:05 97.4 98 18 123/61 (81) 95   


 


12/14/17 21:20 97.7 115 20 155/81 (105) 96   








Result Diagram:  


12/13/17 0840                                                                  

              12/13/17 0840





Imaging





Last Impressions








Chest X-Ray 12/10/17 0237 Signed





Impressions: 





 Service Date/Time:  Hari, December 10, 2017 03:01 - CONCLUSION: No acute 





 disease.       Tobi Alan MD 


 


Neck CT 12/9/17 2335 Signed





Impressions: 





 Service Date/Time:  Hari, December 10, 2017 00:32 - CONCLUSION:  1. Abnormal 





 stranding of the subcutaneous fat and skin thickening left neck and parotid 





 region with asymmetric enhancement of the left parotid as compared to the 

right. 





 The findings would be characteristic of a parotitis and cellulitis. 2. Mildly 





 distended, fluid-filled esophagus is present. 3. Extra-axial left frontal 





 intracranial mass previously characterized as a meningioma. 4. 

Atherosclerosis.  





    Tobi Alan MD 











A/P


Problem List:  


(1) Parotitis, acute


ICD Codes:  K11.21 - Acute sialoadenitis


Status:  Acute


Plan:  (1) Parotitis, acute


ICD Codes:  K11.21 - Acute sialoadenitis


Status:  Acute


Plan:  Patient had noticed progressively worsening redness and swelling to the 

left side of the face and neck.  Patient has noted some soreness with 

swallowing but no difficulty swallowing or handling her oral secretions.  

Patient denies any injury.  Family members have noticed increasing swelling 

significant this evening with associated generalized weakness, nausea and 

vomiting.


- Neck CT reviewed and reveals:   


1. Abnormal stranding of the subcutaneous fat and skin thickening left neck and 

parotid region with asymmetric enhancement of the left parotid as compared to 

the right. The findings would be characteristic of a parotitis and cellulitis.


2. Mildly distended, fluid-filled esophagus is present.


3. Extra-axial left frontal intracranial mass previously characterized as a 

meningioma.


4. Atherosclerosis.





left parotitis appears significantly improved from admission


esbl ecoli positive urine which was ordered by ED...


ID consulted to guide abx therapy.


will plan for d/c once abx regimen decided upon.


...discuss with Dr Ward today.


discuss with family.


she will plan for d/c home with [facility] and family.





(2) Cellulitis of face


ICD Codes:  L03.211 - Cellulitis of face


Status:  Acute


Plan:  see above





(3) Nausea & vomiting


ICD Codes:  R11.2 - Nausea with vomiting, unspecified


Status:  Resolved


Plan:  - Zofran needed


- supportive care





(4) UTI (urinary tract infection)


ICD Codes:  N39.0 - Urinary tract infection, site not specified


above





(5) Hyponatremia


ICD Codes:  E87.1 - Hypo-osmolality and hyponatremia


Status:  Acute


Plan:  Na 130 on admission -> (12/11) 134


likely secondary to poor PO intake with encourage PO intake 


off ivf.








(6) Hypothyroidism


ICD Codes:  E03.9 - Hypothyroidism, unspecified


Status:  Chronic


Plan:  - Cont. home meds





(7) Hyperlipidemia


ICD Codes:  E78.5 - Hyperlipidemia, unspecified


Status:  Chronic


Plan:  - Cont. home meds





(8) Depression


ICD Codes:  F32.9 - Major depressive disorder, single episode, unspecified


Status:  Chronic


Plan:  - Cont. home meds





(2) Cellulitis of face


ICD Codes:  L03.211 - Cellulitis of face


Status:  Acute


Plan:  see above





(3) Nausea & vomiting


ICD Codes:  R11.2 - Nausea with vomiting, unspecified


Status:  Resolved


Plan:  see above





(4) UTI (urinary tract infection)


ICD Codes:  N39.0 - Urinary tract infection, site not specified


Plan:  see above





(5) Hyponatremia


ICD Codes:  E87.1 - Hypo-osmolality and hyponatremia


Status:  Acute


Plan:  see above





(6) Hypothyroidism


ICD Codes:  E03.9 - Hypothyroidism, unspecified


Status:  Chronic


Plan:  see above





(7) Hyperlipidemia


ICD Codes:  E78.5 - Hyperlipidemia, unspecified


Status:  Chronic


Plan:  - Cont. home meds





(8) Depression


ICD Codes:  F32.9 - Major depressive disorder, single episode, unspecified


Status:  Chronic


Plan:  see above














Jermaine Caldwell MD Dec 15, 2017 10:51

## 2017-12-16 VITALS
RESPIRATION RATE: 21 BRPM | SYSTOLIC BLOOD PRESSURE: 124 MMHG | DIASTOLIC BLOOD PRESSURE: 57 MMHG | TEMPERATURE: 98 F | HEART RATE: 83 BPM | OXYGEN SATURATION: 96 %

## 2017-12-16 VITALS
DIASTOLIC BLOOD PRESSURE: 50 MMHG | OXYGEN SATURATION: 94 % | HEART RATE: 84 BPM | TEMPERATURE: 98.1 F | SYSTOLIC BLOOD PRESSURE: 127 MMHG | RESPIRATION RATE: 18 BRPM

## 2017-12-16 VITALS
HEART RATE: 81 BPM | RESPIRATION RATE: 18 BRPM | DIASTOLIC BLOOD PRESSURE: 72 MMHG | TEMPERATURE: 98.3 F | SYSTOLIC BLOOD PRESSURE: 149 MMHG | OXYGEN SATURATION: 95 %

## 2017-12-16 VITALS
DIASTOLIC BLOOD PRESSURE: 84 MMHG | SYSTOLIC BLOOD PRESSURE: 141 MMHG | HEART RATE: 61 BPM | RESPIRATION RATE: 18 BRPM | TEMPERATURE: 98 F | OXYGEN SATURATION: 99 %

## 2017-12-16 VITALS
OXYGEN SATURATION: 96 % | DIASTOLIC BLOOD PRESSURE: 56 MMHG | SYSTOLIC BLOOD PRESSURE: 122 MMHG | RESPIRATION RATE: 21 BRPM | HEART RATE: 82 BPM | TEMPERATURE: 98.1 F

## 2017-12-16 VITALS
SYSTOLIC BLOOD PRESSURE: 133 MMHG | TEMPERATURE: 98.1 F | OXYGEN SATURATION: 95 % | HEART RATE: 85 BPM | RESPIRATION RATE: 22 BRPM | DIASTOLIC BLOOD PRESSURE: 61 MMHG

## 2017-12-16 PROCEDURE — 05H633Z INSERTION OF INFUSION DEVICE INTO LEFT SUBCLAVIAN VEIN, PERCUTANEOUS APPROACH: ICD-10-PCS

## 2017-12-16 PROCEDURE — B547ZZA ULTRASONOGRAPHY OF LEFT SUBCLAVIAN VEIN, GUIDANCE: ICD-10-PCS

## 2017-12-16 RX ADMIN — HYDROCODONE BITARTRATE AND ACETAMINOPHEN PRN TAB: 5; 325 TABLET ORAL at 21:06

## 2017-12-16 RX ADMIN — LINEZOLID SCH MG: 600 TABLET, FILM COATED ORAL at 21:06

## 2017-12-16 RX ADMIN — ERTAPENEM SODIUM SCH MLS/HR: 1 INJECTION, POWDER, LYOPHILIZED, FOR SOLUTION INTRAMUSCULAR; INTRAVENOUS at 00:37

## 2017-12-16 RX ADMIN — ERTAPENEM SODIUM SCH MLS/HR: 1 INJECTION, POWDER, LYOPHILIZED, FOR SOLUTION INTRAMUSCULAR; INTRAVENOUS at 18:33

## 2017-12-16 RX ADMIN — BUPROPION HYDROCHLORIDE SCH MG: 150 TABLET, FILM COATED ORAL at 21:05

## 2017-12-16 RX ADMIN — LINEZOLID SCH MG: 600 TABLET, FILM COATED ORAL at 08:00

## 2017-12-16 RX ADMIN — OXYBUTYNIN CHLORIDE SCH MG: 5 TABLET ORAL at 13:21

## 2017-12-16 RX ADMIN — ONDANSETRON PRN MG: 2 INJECTION, SOLUTION INTRAMUSCULAR; INTRAVENOUS at 21:23

## 2017-12-16 RX ADMIN — PRAVASTATIN SODIUM SCH MG: 40 TABLET ORAL at 21:08

## 2017-12-16 RX ADMIN — LEVOTHYROXINE SODIUM SCH MCG: 100 TABLET ORAL at 06:39

## 2017-12-16 RX ADMIN — Medication SCH ML: at 21:00

## 2017-12-16 RX ADMIN — HYDROCODONE BITARTRATE AND ACETAMINOPHEN PRN TAB: 5; 325 TABLET ORAL at 13:20

## 2017-12-16 RX ADMIN — BUPROPION HYDROCHLORIDE SCH MG: 150 TABLET, FILM COATED ORAL at 09:00

## 2017-12-16 RX ADMIN — OXYBUTYNIN CHLORIDE SCH MG: 5 TABLET ORAL at 21:08

## 2017-12-16 NOTE — HHI.PR
Subjective


Remarks


complaining about breakfast. to hard. she wants oatmeal


yesterday she was sick on her stomach and vomited.


discussed with family and they are ok with picc and home iv abx.





Objective


Vitals


heart reg


lung cta


abd s/nt


ext no pitting


heent..left parotid gland only minimally swollen


 at this point.


Vital Signs








  Date Time  Temp Pulse Resp B/P (MAP) Pulse Ox O2 Delivery O2 Flow Rate FiO2


 


12/16/17 08:00 98.0 83 21 124/57 (79) 96   





    Manual Cuff/Auscultation    


 


12/16/17 04:56 98.1 84 18 127/50 (75) 94   


 


12/16/17 00:38 98.3 81 18 149/72 (97) 95   


 


12/15/17 20:17 98.0 88 18 132/64 (86) 99   


 


12/15/17 16:06 98.2 133 18 122/74 (90) 93   








Result Diagram:  


12/13/17 0840                                                                  

              12/13/17 0840





Imaging





Last Impressions








Chest X-Ray 12/10/17 0237 Signed





Impressions: 





 Service Date/Time:  Hari, December 10, 2017 03:01 - CONCLUSION: No acute 





 disease.       Tobi Alan MD 


 


Neck CT 12/9/17 9011 Signed





Impressions: 





 Service Date/Time:  Hari, December 10, 2017 00:32 - CONCLUSION:  1. Abnormal 





 stranding of the subcutaneous fat and skin thickening left neck and parotid 





 region with asymmetric enhancement of the left parotid as compared to the 

right. 





 The findings would be characteristic of a parotitis and cellulitis. 2. Mildly 





 distended, fluid-filled esophagus is present. 3. Extra-axial left frontal 





 intracranial mass previously characterized as a meningioma. 4. 

Atherosclerosis.  





    Tobi Alan MD 











A/P


Problem List:  


(1) Parotitis, acute


ICD Codes:  K11.21 - Acute sialoadenitis


Status:  Acute


Plan:  (1) Parotitis, acute


ICD Codes:  K11.21 - Acute sialoadenitis


Status:  Acute


Plan:  Patient had noticed progressively worsening redness and swelling to the 

left side of the face and neck.  Patient has noted some soreness with 

swallowing but no difficulty swallowing or handling her oral secretions.  

Patient denies any injury.  Family members have noticed increasing swelling 

significant this evening with associated generalized weakness, nausea and 

vomiting.


- Neck CT reviewed and reveals:   


1. Abnormal stranding of the subcutaneous fat and skin thickening left neck and 

parotid region with asymmetric enhancement of the left parotid as compared to 

the right. The findings would be characteristic of a parotitis and cellulitis.


2. Mildly distended, fluid-filled esophagus is present.


3. Extra-axial left frontal intracranial mass previously characterized as a 

meningioma.


4. Atherosclerosis.





left parotitis appears significantly improved from admission


esbl ecoli positive urine which was ordered by ED...


ID consulted to guide abx therapy.


discussed with Dr Ward..recommended po zyvoxx and 2 weeks iv invanz


awaiting picc line. arrange [facility]


pt has upset stomach yesterday and vomiting...will need to assure tolerating 

food


 before d/c home...?later today vs tomorrow.


w





(2) Cellulitis of face


ICD Codes:  L03.211 - Cellulitis of face


Status:  Acute


Plan:  see above





(3) Nausea & vomiting


ICD Codes:  R11.2 - Nausea with vomiting, unspecified


Status:  Resolved


Plan:  - Zofran needed


- supportive care





(4) UTI (urinary tract infection)


ICD Codes:  N39.0 - Urinary tract infection, site not specified


above





(5) Hyponatremia


ICD Codes:  E87.1 - Hypo-osmolality and hyponatremia


Status:  Acute


Plan:  Na 130 on admission -> (12/11) 134


likely secondary to poor PO intake with encourage PO intake 


off ivf.








(6) Hypothyroidism


ICD Codes:  E03.9 - Hypothyroidism, unspecified


Status:  Chronic


Plan:  - Cont. home meds





(7) Hyperlipidemia


ICD Codes:  E78.5 - Hyperlipidemia, unspecified


Status:  Chronic


Plan:  - Cont. home meds





(8) Depression


ICD Codes:  F32.9 - Major depressive disorder, single episode, unspecified


Status:  Chronic


Plan:  - Cont. home meds





(2) Cellulitis of face


ICD Codes:  L03.211 - Cellulitis of face


Status:  Acute


Plan:  see above





(3) Nausea & vomiting


ICD Codes:  R11.2 - Nausea with vomiting, unspecified


Status:  Resolved


Plan:  see above





(4) UTI (urinary tract infection)


ICD Codes:  N39.0 - Urinary tract infection, site not specified


Plan:  see above





(5) Hyponatremia


ICD Codes:  E87.1 - Hypo-osmolality and hyponatremia


Status:  Acute


Plan:  see above





(6) Hypothyroidism


ICD Codes:  E03.9 - Hypothyroidism, unspecified


Status:  Chronic


Plan:  see above





(7) Hyperlipidemia


ICD Codes:  E78.5 - Hyperlipidemia, unspecified


Status:  Chronic


Plan:  - Cont. home meds





(8) Depression


ICD Codes:  F32.9 - Major depressive disorder, single episode, unspecified


Status:  Chronic


Plan:  see above














Jermaine Caldwell MD Dec 16, 2017 09:19

## 2017-12-16 NOTE — HHI.FF
Face to Face Verification


Diagnosis:  


(1) Escherichia coli urinary tract infection


(2) Parotitis, acute


(3) Cellulitis of face


(4) Nausea & vomiting


(5) Hypothyroidism


(6) Hyperlipidemia


(7) Depression


Physical Therapy


Order:  Evaluate and Treat, Improve ambulation





Home Health Nursing








Order: Medical education





 Signs/symptoms of disease process





 Medication education-adverse effect





 Wound care and dressing changes





 Nursing assessment with vital signs





 IV medication administration

















I have seen patient Radha Redd on 12/16/17. My clinical findings support 

the need for the requested home health care services because:








 Limited ability to care for self














I certify that my clinical findings support that this patient is homebound 

because:








 Impaired cognitive ability/safety

















Jermaine Caldwell MD Dec 16, 2017 09:21

## 2017-12-17 VITALS
RESPIRATION RATE: 22 BRPM | SYSTOLIC BLOOD PRESSURE: 126 MMHG | TEMPERATURE: 97.5 F | HEART RATE: 78 BPM | DIASTOLIC BLOOD PRESSURE: 60 MMHG | OXYGEN SATURATION: 95 %

## 2017-12-17 VITALS
RESPIRATION RATE: 18 BRPM | HEART RATE: 74 BPM | DIASTOLIC BLOOD PRESSURE: 68 MMHG | OXYGEN SATURATION: 92 % | SYSTOLIC BLOOD PRESSURE: 102 MMHG | TEMPERATURE: 98 F

## 2017-12-17 VITALS
RESPIRATION RATE: 22 BRPM | OXYGEN SATURATION: 95 % | DIASTOLIC BLOOD PRESSURE: 58 MMHG | TEMPERATURE: 97.5 F | SYSTOLIC BLOOD PRESSURE: 126 MMHG | HEART RATE: 82 BPM

## 2017-12-17 VITALS
SYSTOLIC BLOOD PRESSURE: 143 MMHG | HEART RATE: 81 BPM | DIASTOLIC BLOOD PRESSURE: 65 MMHG | OXYGEN SATURATION: 94 % | RESPIRATION RATE: 18 BRPM | TEMPERATURE: 97.3 F

## 2017-12-17 RX ADMIN — OXYBUTYNIN CHLORIDE SCH MG: 5 TABLET ORAL at 09:00

## 2017-12-17 RX ADMIN — ERTAPENEM SODIUM SCH MLS/HR: 1 INJECTION, POWDER, LYOPHILIZED, FOR SOLUTION INTRAMUSCULAR; INTRAVENOUS at 13:41

## 2017-12-17 RX ADMIN — BUPROPION HYDROCHLORIDE SCH MG: 150 TABLET, FILM COATED ORAL at 09:00

## 2017-12-17 RX ADMIN — LEVOTHYROXINE SODIUM SCH MCG: 100 TABLET ORAL at 06:06

## 2017-12-17 RX ADMIN — LINEZOLID SCH MG: 600 TABLET, FILM COATED ORAL at 08:00

## 2017-12-17 NOTE — HHI.DS
Discharge Summary


Admission Date


Dec 10, 2017 at 02:48


Discharge Date:  Dec 17, 2017


Admitting Diagnosis





Facial cellulitis L parotitis





(1) Parotitis, acute


Diagnosis:  Principal


ICD Codes:  K11.21 - Acute sialoadenitis


Status:  Acute


(2) Cellulitis of face


Diagnosis:  Principal


ICD Codes:  L03.211 - Cellulitis of face


Status:  Acute


(3) Nausea & vomiting


Diagnosis:  Principal


ICD Codes:  R11.2 - Nausea with vomiting, unspecified


Status:  Resolved


(4) UTI (urinary tract infection)


Diagnosis:  Principal


ICD Codes:  N39.0 - Urinary tract infection, site not specified


(5) Hyponatremia


Diagnosis:  Secondary


ICD Codes:  E87.1 - Hypo-osmolality and hyponatremia


Status:  Acute


(6) Hypothyroidism


Diagnosis:  Secondary


ICD Codes:  E03.9 - Hypothyroidism, unspecified


Status:  Chronic


(7) Hyperlipidemia


ICD Codes:  E78.5 - Hyperlipidemia, unspecified


Status:  Chronic


(8) Depression


Diagnosis:  Secondary


ICD Codes:  F32.9 - Major depressive disorder, single episode, unspecified


Status:  Chronic


Brief History


Ms. Redd is a pleasant 88 y/o female with hypothyroidism, hyperlipidemia, RLS, 

Fibromyalgia DDD and depression/anxiety.  Patient presents to the hospital due 

to progressively worsening redness and swelling to the left side of the face 

and neck, patient unable to tell exactly how long this has been present.  

Patient has noted some soreness with swallowing but no difficulty swallowing or 

handling her oral secretions.  Patient denies any injury.  Family members have 

noticed increasing swelling significant on the evening of 12/9.  Patient lives 

by herself.  Family has been checking on her frequently.  This evening symptoms 

also associated with increased generalized weakness, nausea and vomiting.  

Patient denies fevers, chills, chest pain, shortness of breath, stridor, 

hoarseness, abdominal pain, flank pain, dysuria, increased urinary frequency 

urgency.


CBC/BMP:  


12/13/17 0840                                                                  

              12/13/17 0840





Hospital Course


(1) Parotitis, acute


Patient had noticed progressively worsening redness and swelling to the left 

side of the face and neck.  Patient has noted some soreness with swallowing but 

no difficulty swallowing or handling her oral secretions.  Patient denies any 

injury.  Family members have noticed increasing swelling significant this 

evening with associated generalized weakness, nausea and vomiting.


- Neck CT reviewed and reveals:   


1. Abnormal stranding of the subcutaneous fat and skin thickening left neck and 

parotid region with asymmetric enhancement of the left parotid as compared to 

the right. The findings would be characteristic of a parotitis and cellulitis.


2. Mildly distended, fluid-filled esophagus is present.


3. Extra-axial left frontal intracranial mass previously characterized as a 

meningioma.


4. Atherosclerosis.





left parotitis appears significantly improved from admission


esbl ecoli positive urine which was ordered by ED...


ID consulted to guide abx therapy.


discussed with Dr Ward..recommended po zyvoxx and 2 weeks iv invanz


picc midline placed on 12/16...HHC/PT arranged. f/u pcp


Pt did have upset stomach over past few days but seems ok now and tolerating 

food.


no vomiting or diarrhea that I am aware of.





(2) Cellulitis of face


ICD Codes:  L03.211 - Cellulitis of face


Status:  Acute


Plan:  see above





(3) Nausea & vomiting


ICD Codes:  R11.2 - Nausea with vomiting, unspecified


Status:  Resolved


Plan:  - Zofran needed


- supportive care





(4) UTI (urinary tract infection)


ICD Codes:  N39.0 - Urinary tract infection, site not specified


above





(5) Hyponatremia


ICD Codes:  E87.1 - Hypo-osmolality and hyponatremia


Status:  Acute


Plan:  Na 130 on admission -> (12/11) 134


likely secondary to poor PO intake with encourage PO intake 


off ivf.








(6) Hypothyroidism


ICD Codes:  E03.9 - Hypothyroidism, unspecified


Status:  Chronic


Plan:  - Cont. home meds





(7) Hyperlipidemia


ICD Codes:  E78.5 - Hyperlipidemia, unspecified


Status:  Chronic


Plan:  - Cont. home meds





(8) Depression


ICD Codes:  F32.9 - Major depressive disorder, single episode, unspecified


Status:  Chronic


Plan:  - Cont. home meds


Pt Condition on Discharge:  Stable


Discharge Disposition:  Disch w/ Home Health Serv


Discharge Instructions


DIET: Follow Instructions for:  As Tolerated, No Restrictions


Speech Therapy-Diet Recommends:  Mechanical Soft


Activities you can perform:  Regular-No Restrictions


Follow up Referrals:  


PCP Follow-up - 1 Week with blaise allen





New Medications:  


Epinephrine Inj (Epinephrine Inj) 1 Mg/Ml (1 Ml) Inj


0.3 MG IV PUSH ONCE PRN for ALLERGIC REACTION, #1 VIAL





Epinephrine Inj (Epinephrine Inj) 1 Mg/Ml (1 Ml) Inj


0.3 MG SQ ONCE PRN for ALLERGIC REACTION, #1 VIAL


Give with any signs of respiratory distress.


Ertapenem Inj (Invanz Inj) 1 Gm Addvial


1 GM IV Q24H for Infection for 10 Days, INJECTION 0 Refills


ADMINISTER IN 100ML NS


Hydrocortisone Inj (Solu-Cortef Inj) 250 Mg/2 Ml Inj


250 MG IV PUSH ONCE PRN for ALLERGIC REACTION, #1 VIAL 0 Refills


Give over 30-60 seconds.


Linezolid (Zyvox) 600 Mg Tab


600 MG PO Q12H for Infection for 7 Days, #14 TAB 0 Refills





 


Changed Medications:  


Omeprazole (Omeprazole) 20 Mg Tab


20 MG PO BID for heartburn, #60 TAB 0 Refills (Changed from: DAILY; 30)





 


Continued Medications:  


Bupropion HCl ER 24 HR (Bupropion HCl ER 24 HR) 300 Mg Tab


300 MG PO DAILY for Control Depression, TAB 0 Refills





Furosemide (Lasix) 20 Mg Tab


20 MG PO DAILY PRN for swelling, #30 TAB 0 Refills





Levothyroxine (Levothyroxine) 100 Mcg Tab


100 MCG PO DAILY for Thyroid, #30 TAB 0 Refills





Lovastatin (Lovastatin) 40 Mg Tab


40 MG PO HS for Cholesterol Management, #30 TAB 0 Refills





Multivitamin with Minerals (Multiple Vitamin) 1 Each Tablet








Oxybutynin (Ditropan) 5 Mg Tab


5 MG PO Q12HR for Urinary Symptom Managemen, #60 TAB 0 Refills





Oxycodone HCl (Oxycodone Hydrochloride) 5 Mg Cap


2 TAB PO Q6HR





Potassium Chloride ER (K-Tab) 8 Meq Tab


8 MEQ PO DAILY for Electrolyte Replacement, #30 TAB 0 Refills





Pramipexole ER 24 HR (Pramipexole ER 24 HR) 1.5 Mg Tab


1.5 MG PO DAILY for restless leg syndrome, #30 TAB 0 Refills

















Jermaine Caldwell MD Dec 17, 2017 09:52

## 2018-02-22 ENCOUNTER — HOSPITAL ENCOUNTER (EMERGENCY)
Dept: HOSPITAL 17 - NEPC | Age: 83
Discharge: HOME | End: 2018-02-22
Payer: MEDICARE

## 2018-02-22 VITALS — BODY MASS INDEX: 29.14 KG/M2 | WEIGHT: 154.32 LBS | HEIGHT: 61 IN

## 2018-02-22 VITALS
SYSTOLIC BLOOD PRESSURE: 118 MMHG | HEART RATE: 84 BPM | RESPIRATION RATE: 16 BRPM | OXYGEN SATURATION: 97 % | TEMPERATURE: 98.4 F | DIASTOLIC BLOOD PRESSURE: 70 MMHG

## 2018-02-22 VITALS — SYSTOLIC BLOOD PRESSURE: 177 MMHG | HEART RATE: 98 BPM | DIASTOLIC BLOOD PRESSURE: 93 MMHG

## 2018-02-22 DIAGNOSIS — J45.909: ICD-10-CM

## 2018-02-22 DIAGNOSIS — R94.31: ICD-10-CM

## 2018-02-22 DIAGNOSIS — Z79.899: ICD-10-CM

## 2018-02-22 DIAGNOSIS — R42: Primary | ICD-10-CM

## 2018-02-22 DIAGNOSIS — E03.9: ICD-10-CM

## 2018-02-22 DIAGNOSIS — I10: ICD-10-CM

## 2018-02-22 LAB
BASOPHILS # BLD AUTO: 0.1 TH/MM3 (ref 0–0.2)
BASOPHILS NFR BLD: 0.5 % (ref 0–2)
BUN SERPL-MCNC: 15 MG/DL (ref 7–18)
CALCIUM SERPL-MCNC: 9.4 MG/DL (ref 8.5–10.1)
CHLORIDE SERPL-SCNC: 104 MEQ/L (ref 98–107)
CREAT SERPL-MCNC: 0.84 MG/DL (ref 0.5–1)
EOSINOPHIL # BLD: 0.1 TH/MM3 (ref 0–0.4)
EOSINOPHIL NFR BLD: 0.8 % (ref 0–4)
ERYTHROCYTE [DISTWIDTH] IN BLOOD BY AUTOMATED COUNT: 16.8 % (ref 11.6–17.2)
GFR SERPLBLD BASED ON 1.73 SQ M-ARVRAT: 64 ML/MIN (ref 89–?)
GLUCOSE SERPL-MCNC: 98 MG/DL (ref 74–106)
HCO3 BLD-SCNC: 29.8 MEQ/L (ref 21–32)
HCT VFR BLD CALC: 39 % (ref 35–46)
HGB BLD-MCNC: 13.3 GM/DL (ref 11.6–15.3)
LYMPHOCYTES # BLD AUTO: 1.5 TH/MM3 (ref 1–4.8)
LYMPHOCYTES NFR BLD AUTO: 15.8 % (ref 9–44)
MAGNESIUM SERPL-MCNC: 2.1 MG/DL (ref 1.5–2.5)
MCH RBC QN AUTO: 28.8 PG (ref 27–34)
MCHC RBC AUTO-ENTMCNC: 34 % (ref 32–36)
MCV RBC AUTO: 84.8 FL (ref 80–100)
MONOCYTE #: 0.6 TH/MM3 (ref 0–0.9)
MONOCYTES NFR BLD: 6.5 % (ref 0–8)
NEUTROPHILS # BLD AUTO: 7.4 TH/MM3 (ref 1.8–7.7)
NEUTROPHILS NFR BLD AUTO: 76.4 % (ref 16–70)
PLATELET # BLD: 198 TH/MM3 (ref 150–450)
PMV BLD AUTO: 8.3 FL (ref 7–11)
RBC # BLD AUTO: 4.61 MIL/MM3 (ref 4–5.3)
SODIUM SERPL-SCNC: 137 MEQ/L (ref 136–145)
TROPONIN I SERPL-MCNC: 0.03 NG/ML (ref 0.02–0.05)
WBC # BLD AUTO: 9.7 TH/MM3 (ref 4–11)

## 2018-02-22 PROCEDURE — 82552 ASSAY OF CPK IN BLOOD: CPT

## 2018-02-22 PROCEDURE — 85025 COMPLETE CBC W/AUTO DIFF WBC: CPT

## 2018-02-22 PROCEDURE — 80048 BASIC METABOLIC PNL TOTAL CA: CPT

## 2018-02-22 PROCEDURE — 70450 CT HEAD/BRAIN W/O DYE: CPT

## 2018-02-22 PROCEDURE — 93005 ELECTROCARDIOGRAM TRACING: CPT

## 2018-02-22 PROCEDURE — 71046 X-RAY EXAM CHEST 2 VIEWS: CPT

## 2018-02-22 PROCEDURE — 84484 ASSAY OF TROPONIN QUANT: CPT

## 2018-02-22 PROCEDURE — 83735 ASSAY OF MAGNESIUM: CPT

## 2018-02-22 PROCEDURE — 82550 ASSAY OF CK (CPK): CPT

## 2018-02-22 NOTE — RADRPT
EXAM DATE/TIME:  02/22/2018 20:15 

 

[facility] COMPARISON:     

CT BRAIN W/O CONTRAST, April 25, 2016, 14:25.

 

 

INDICATIONS :     

Dizziness with palpitations.

                      

 

RADIATION DOSE:     

34.35 CTDIvol (mGy) 

 

 

 

MEDICAL HISTORY :     

Hypertension.  

 

SURGICAL HISTORY :      

Hysterectomy. 

 

ENCOUNTER:      

Initial

 

ACUITY:      

1 day

 

PAIN SCALE:      

1/10

 

LOCATION:       

Bilateral cranial 

 

TECHNIQUE:     

Multiple contiguous axial images were obtained of the head.  Using automated exposure control and adj
ustment of the mA and/or kV according to patient size, radiation dose was kept as low as reasonably a
chievable to obtain optimal diagnostic quality images.   DICOM format image data is available electro
nically for review and comparison.  

 

FINDINGS:     

 

CEREBRUM:     

There is a stable extra-axial left frontal high density mass measuring 3.5 x 2.8 cm. Moderate diffuse
 cerebral volume loss. The ventricles are normal for degree of atrophy.  No evidence of midline shift
, mass lesion, hemorrhage or acute infarction.  No extra-axial fluid collections are seen.

 

POSTERIOR FOSSA:     

The cerebellum and brainstem are intact.  The 4th ventricle is midline.  The cerebellopontine angle i
s unremarkable.

 

EXTRACRANIAL:     

The visualized portion of the orbits is intact.

 

SKULL:     

The calvaria is intact.  No evidence of skull fracture.

 

CONCLUSION:     

1. Stable 3.5 x 2.8 cm left frontal extra-axial mass, likely meningioma.

2. Stable senescent changes without acute intracranial abnormality.

 

 

 

 Tod Amos MD on February 22, 2018 at 20:38           

Board Certified Radiologist.

 This report was verified electronically.

## 2018-02-22 NOTE — RADRPT
EXAM DATE/TIME:  02/22/2018 18:08 

 

[facility] COMPARISON:     

 

                     

INDICATIONS :     

Dizziness and dyspnea for four days.

                     

 

MEDICAL HISTORY :            

Asthma. Hypertension. Cardiovascular disease.   

 

SURGICAL HISTORY :        

Appendectomy. Cholecystectomy.Hysterectomy.

 

ENCOUNTER:     

Initial                                        

 

ACUITY:     

4 - 6 days      

 

PAIN SCORE:     

0/10

 

LOCATION:     

Bilateral chest 

 

FINDINGS:     

 

Minimal airspace disease at the left lung similar to prior exam. Cardiomediastinal contours are withi
n normal limits. Bony thorax is intact.

 

CONCLUSION:     

1. Minimal left lung base atelectasis/scarring.

2. No acute abnormality or significant interval change.

 

 

 

 Tod Amos MD on February 22, 2018 at 18:20           

Board Certified Radiologist.

 This report was verified electronically.

## 2018-02-22 NOTE — PD
HPI


Chief Complaint:  Cardiac Complaint


Time Seen by Provider:  18:30


Travel History


International Travel<30 days:  No


Contact w/Intl Traveler<30days:  No


Traveled to known affect area:  No





History of Present Illness


HPI


This is an 89-year-old female who was sent here from [facility] urgent 

care for evaluation of dizziness.  The past 1.5 weeks she has been experiencing 

intermittent dizziness which she describes as a lightheadedness that seems to 

radiate from her feet to her head.  Symptoms come on spontaneously with no 

aggravating or alleviating factors.  Currently asymptomatic.  She was seen at 

[facility] clinic today where her heart rate was noted to be as high 

as the 160s intermittently according to her notes.  An EKG was concerning for 

possible atrial fibrillation.  She was sent here for further evaluation.  She 

denies any history of atrial fibrillation.  She denies any chest pain, 

shortness of breath, nausea or vomiting, palpitations, headache, blurred vision

, abdominal pain, diarrhea.  She has no other complaints at this time.  Her 

primary care physician is Dr. Mercedes.





[facility]


Past Medical History


Hx Anticoagulant Therapy:  No


Blood Disorders:  No


Anxiety:  Yes


Depression:  Yes


Cancer:  No


Cardiovascular Problems:  Yes


High Cholesterol:  Yes


Diabetes:  No


Endocrine:  Yes


Gastrointestinal Disorders:  Yes (Appy/ choly)


Glaucoma:  No


Gout:  Yes


Genitourinary:  No


Hepatitis:  No


Hiatal Hernia:  No


Hypertension:  Yes


Implanted Vascular Access Dvce:  No


Musculoskeletal:  Yes


Neurologic:  No


Psychiatric:  Yes


Reproductive:  No


Respiratory:  Yes (ASTHMA)


Thyroid Disease:  Yes (hypothyroidism)


Pregnant?:  Not Pregnant


Tubal Ligation:  Yes





Past Surgical History


Abdominal Surgery:  Yes (DEEJAY)


Appendectomy:  Yes


 Section:  Yes


Cholecystectomy:  Yes


Eye Surgery:  Yes (CATARACT RIGHT EYE)


Genitourinary Surgery:  Yes (bladder suspension)


Hysterectomy:  Yes


Pacemaker:  No


Other Surgery:  Yes





Social History


Alcohol Use:  No


Tobacco Use:  No


Substance Use:  No





Allergies-Medications


(Allergen,Severity, Reaction):  


Coded Allergies:  


     acetaminophen (Verified  Allergy, Severe, 18)


 unknown


     asparagus (Unverified  Allergy, Severe, sob, 18)


     bean pod (Unverified  Allergy, Severe, sob, 18)


     duloxetine (Unverified  Allergy, Unknown, 18)


     rivaroxaban (Unverified  Allergy, Unknown, 18)


     trazodone (Unverified  Allergy, Unknown, 18)


Reported Meds & Prescriptions





Reported Meds & Active Scripts


Active


Metoprolol Tartrate 25 Mg Tab 25 Mg PO BID


Omeprazole 20 Mg Tab 20 Mg PO BID


Epinephrine Inj 1 Mg/Ml (1 Ml) Inj 0.3 Mg SQ ONCE PRN


     Give with any signs of respiratory distress.


Epinephrine Inj 1 Mg/Ml (1 Ml) Inj 0.3 Mg IV PUSH ONCE PRN


Reported


Ventolin Hfa 18 GM Inh (Albuterol Sulfate) 90 Mcg/Act Aer 2 Puff INH Q6H PRN


Vitamin D-1000 (Cholecalciferol) 1,000 Unit Tab 2,000 Units PO DAILY


Vitamin K2 (Menaquinone-7) 40 Mcg Tab 40 Mcg PO DAILY


Ondansetron Odt 4 Mg Tab 4 Mg SL TID PRN


Diphenoxylate-Atropine 2.5-0.025 Mg Tab 1 Tab PO TID PRN


Clotrimazole Topical (Clotrimazole) 1% Soln 1 Applic TOPICAL BID


K-Tab (Potassium Chloride) 8 Meq Tab 8 Meq PO DAILY


Oxycodone Hydrochloride (Oxycodone HCl) 5 Mg Cap 2 Tab PO Q8HR


Pramipexole ER 24 HR (Pramipexole Dihydrochloride) 1.5 Mg Tab 1.5 Mg PO BID


Ditropan (Oxybutynin Chloride) 5 Mg Tab 5 Mg PO Q12HR


Multiple Vitamin (Multivitamin with Minerals) 1 Each Tablet   


Levothyroxine (Levothyroxine Sodium) 100 Mcg Tab 100 Mcg PO DAILY


Lasix (Furosemide) 20 Mg Tab 20 Mg PO DAILY PRN


Bupropion HCl ER 24 HR (Bupropion HCl) 300 Mg Tab 300 Mg PO DAILY








Review of Systems


Except as stated in HPI:  all other systems reviewed are Neg





Physical Exam


Narrative


GENERAL: Pleasant elderly female in no acute distress.


SKIN: Warm and dry.


HEAD: Atraumatic. Normocephalic. 


EYES: Pupils equal and round. No scleral icterus. No injection or drainage. 


ENT: No nasal bleeding or discharge.  Mucous membranes pink and moist.


NECK: Trachea midline. No JVD. 


CARDIOVASCULAR: Regular rate and rhythm.  No murmur appreciated.


RESPIRATORY: No accessory muscle use. Clear to auscultation. Breath sounds 

equal bilaterally. 


GASTROINTESTINAL: Abdomen soft, non-tender, nondistended. Hepatic and splenic 

margins not palpable. 


MUSCULOSKELETAL: No obvious deformities. No clubbing.  No cyanosis.  No edema. 


NEUROLOGICAL: Awake and alert. No obvious cranial nerve deficits.  Motor 

grossly within normal limits. Normal speech.


PSYCHIATRIC: Appropriate mood and affect; insight and judgment normal.





Data


Data


Last Documented VS





Vital Signs








  Date Time  Temp Pulse Resp B/P (MAP) Pulse Ox O2 Delivery O2 Flow Rate FiO2


 


18 20:11  98  177/93 (121)    


 


18 17:34 98.4  16  97   








Orders





 Orders


Complete Blood Count With Diff (18 17:48)


Basic Metabolic Panel (Bmp) (18 17:48)


Troponin I (18 17:48)


Iv Access Insert/Monitor (18 17:48)


Ecg Monitoring (18 17:48)


Oxygen Administration (18 17:48)


Oximetry (18 17:48)


Chest, Pa & Lat (18 17:48)


Ct Brain W/O Iv Contrast(Rout) (18 )


Creatine Kinase (Cpk) (18 19:20)


Magnesium (Mg) (18 19:20)


Troponin I (18 19:20)


CKMB (18 19:20)


CKMB% (18 19:20)


Diet Regular Basic (18 Dinner)


Electrocardiogram (18 18:46)





Labs





Laboratory Tests








Test


  18


19:20


 


White Blood Count 9.7 TH/MM3 


 


Red Blood Count 4.61 MIL/MM3 


 


Hemoglobin 13.3 GM/DL 


 


Hematocrit 39.0 % 


 


Mean Corpuscular Volume 84.8 FL 


 


Mean Corpuscular Hemoglobin 28.8 PG 


 


Mean Corpuscular Hemoglobin


Concent 34.0 % 


 


 


Red Cell Distribution Width 16.8 % 


 


Platelet Count 198 TH/MM3 


 


Mean Platelet Volume 8.3 FL 


 


Neutrophils (%) (Auto) 76.4 % 


 


Lymphocytes (%) (Auto) 15.8 % 


 


Monocytes (%) (Auto) 6.5 % 


 


Eosinophils (%) (Auto) 0.8 % 


 


Basophils (%) (Auto) 0.5 % 


 


Neutrophils # (Auto) 7.4 TH/MM3 


 


Lymphocytes # (Auto) 1.5 TH/MM3 


 


Monocytes # (Auto) 0.6 TH/MM3 


 


Eosinophils # (Auto) 0.1 TH/MM3 


 


Basophils # (Auto) 0.1 TH/MM3 


 


CBC Comment DIFF FINAL 


 


Differential Comment  


 


Blood Urea Nitrogen 15 MG/DL 


 


Creatinine 0.84 MG/DL 


 


Random Glucose 98 MG/DL 


 


Calcium Level 9.4 MG/DL 


 


Magnesium Level 2.1 MG/DL 


 


Sodium Level 137 MEQ/L 


 


Potassium Level 4.0 MEQ/L 


 


Chloride Level 104 MEQ/L 


 


Carbon Dioxide Level 29.8 MEQ/L 


 


Anion Gap 3 MEQ/L 


 


Estimat Glomerular Filtration


Rate 64 ML/MIN 


 


 


Total Creatine Kinase 207 U/L 


 


Creatine Kinase MB 0.5 NG/ML 


 


Creatine Kinase MB % 0.2 % 


 


Troponin I 0.03 NG/ML 











MDM


Medical Decision Making


Medical Screen Exam Complete:  Yes


Emergency Medical Condition:  Yes


Medical Record Reviewed:  Yes


Differential Diagnosis


Atrial fibrillation, electrolyte abnormality, near-syncope, orthostatic 

hypotension


Narrative Course


The patient's EKG here demonstrates sinus rhythm.  Looking over her EKGs from 

[facility] it appears that the patient had sinus tachycardia with 

occasional PVCs.  Lab work, CT the brain has been ordered.  The patient was put 

on cardiac telemetry.  She was monitored here for a few hours and the highest 

heart rate recorded was in the low 100s.  I discussed with the patient the 

option of staying for observation versus following up as an outpatient and she 

would prefer to be discharged.  I did discuss with Dr. Smallwood is on-call for 

[facility] who will help arrange outpatient follow-up tomorrow and the 

patient will be placed on a low-dose metoprolol for rate control purposes.  The 

patient and her daughter understand that she should return for any acutely new 

or worsening symptoms.  She has been asymptomatic during her hospital stay.





Diagnosis





 Primary Impression:  


 Lightheadedness





Admitting Information


Admitting Physician Requests:  Observation





***Additional Instructions:  


Medication as prescribed.  Follow-up with your primary care physician tomorrow.

  Return for any acutely new or worsening symptoms such as severe 

lightheadedness, chest pain, shortness of breath.


***Med/Other Pt SpecificInfo:  Prescription(s) given


Scripts


Metoprolol Tartrate (Metoprolol Tartrate) 25 Mg Tab


25 MG PO BID, #60 TAB 0 Refills


   Prov: David Braun MD         18


Disposition:  01 DISCHARGE HOME


Condition:  Stable











Ziggy Juárez 2018 18:47

## 2018-02-23 NOTE — EKG
Date Performed: 02/22/2018       Time Performed: 18:46:57

 

PTAGE:      89 years

 

EKG:      Sinus rhythm 

 

 WITH SINUS ARRHYTHMIA PATTERN CONSISTENT WITH PULMONARY DISEASE LEFT ANTERIOR FASCICULAR BLOCK ABNOR
MAL ECG

 

PREVIOUS TRACING       : 12/10/2017 02.51 Since the prior tracing, there has been no significant reed


 

DOCTOR:   Chrissie Daniels  Interpretating Date/Time  02/23/2018 14:34:03